# Patient Record
Sex: FEMALE | Race: WHITE | NOT HISPANIC OR LATINO | Employment: UNEMPLOYED | URBAN - METROPOLITAN AREA
[De-identification: names, ages, dates, MRNs, and addresses within clinical notes are randomized per-mention and may not be internally consistent; named-entity substitution may affect disease eponyms.]

---

## 2021-07-08 ENCOUNTER — OFFICE VISIT (OUTPATIENT)
Dept: CARDIOLOGY | Facility: CLINIC | Age: 41
End: 2021-07-08

## 2021-07-08 VITALS
HEART RATE: 104 BPM | BODY MASS INDEX: 34.15 KG/M2 | HEIGHT: 64 IN | SYSTOLIC BLOOD PRESSURE: 130 MMHG | DIASTOLIC BLOOD PRESSURE: 88 MMHG | WEIGHT: 200 LBS

## 2021-07-08 DIAGNOSIS — R00.2 PALPITATIONS: Primary | ICD-10-CM

## 2021-07-08 DIAGNOSIS — E78.5 HYPERLIPIDEMIA, UNSPECIFIED HYPERLIPIDEMIA TYPE: ICD-10-CM

## 2021-07-08 DIAGNOSIS — R42 DIZZINESS: ICD-10-CM

## 2021-07-08 DIAGNOSIS — I10 ESSENTIAL HYPERTENSION: ICD-10-CM

## 2021-07-08 PROCEDURE — 99204 OFFICE O/P NEW MOD 45 MIN: CPT | Performed by: INTERNAL MEDICINE

## 2021-07-08 RX ORDER — DOXEPIN HYDROCHLORIDE 25 MG/1
CAPSULE ORAL
COMMUNITY
Start: 2021-05-29

## 2021-07-08 RX ORDER — CETIRIZINE HYDROCHLORIDE 10 MG/1
TABLET ORAL
COMMUNITY
Start: 2021-06-10

## 2021-07-08 RX ORDER — PREGABALIN 150 MG/1
CAPSULE ORAL
COMMUNITY
Start: 2021-06-15

## 2021-07-08 RX ORDER — VENLAFAXINE HYDROCHLORIDE 150 MG/1
CAPSULE, EXTENDED RELEASE ORAL
COMMUNITY
Start: 2021-03-03

## 2021-07-08 RX ORDER — ERGOCALCIFEROL 1.25 MG/1
CAPSULE ORAL
COMMUNITY
Start: 2021-07-04

## 2021-07-08 RX ORDER — ALBUTEROL SULFATE 1.25 MG/3ML
SOLUTION RESPIRATORY (INHALATION)
COMMUNITY
Start: 2021-05-13

## 2021-07-08 RX ORDER — CYCLOBENZAPRINE HCL 10 MG
10 TABLET ORAL
COMMUNITY

## 2021-07-08 RX ORDER — HYDROCODONE BITARTRATE AND ACETAMINOPHEN 7.5; 325 MG/1; MG/1
TABLET ORAL
COMMUNITY
Start: 2021-02-19

## 2021-07-08 RX ORDER — SUMATRIPTAN 50 MG/1
TABLET, FILM COATED ORAL
COMMUNITY
Start: 2021-05-14

## 2021-07-08 RX ORDER — BISOPROLOL FUMARATE AND HYDROCHLOROTHIAZIDE 10; 6.25 MG/1; MG/1
1 TABLET ORAL DAILY
Qty: 30 TABLET | Refills: 6 | Status: SHIPPED | OUTPATIENT
Start: 2021-07-08 | End: 2022-02-28

## 2021-07-08 RX ORDER — PRAMIPEXOLE DIHYDROCHLORIDE 0.5 MG/1
TABLET ORAL
COMMUNITY
Start: 2021-07-06

## 2021-07-08 NOTE — PROGRESS NOTES
NEW PT   PALPS  REF DR HAWLEY   Subjective:        [facility]  Cardiology Consult Note    Patient Identification:  Name: Jesi Reyes  Age: 40 y.o.  Sex: female  :  1980  MRN: [id number]             CC  FAST HEART BEAT  ABNORMAL EKG  DIZZINESS, NEAR SYNCOPE  HTN  HYPERLIPIDEMIA    History of Present Illness:   40-year-old female here for the cardiac evaluation as well as establishment of the care as the patient complaining of the fast heartbeat moderate in intensity, was found to have abnormal EKG on routine evaluation    Patient also complains of significant dizziness and near syncopal episodes intermittent    Patient known to have the benign essential arterial hypertension and hyperlipidemia    Comorbid cardiac risk factors:     Past Medical History:  Past Medical History:   Diagnosis Date   • Chest pain    • Palpitation      Past Surgical History:  Past Surgical History:   Procedure Laterality Date   • HERNIA REPAIR     • HYSTERECTOMY     • TUBAL ABDOMINAL LIGATION        Allergies:  Allergies   Allergen Reactions   • Eggs Or Egg-Derived Products Nausea And Vomiting   • Emetrol Unknown - High Severity   • Ketorolac Tromethamine Hives   • Latex Hives   • Lecithin Hives   • Mometasone Furoate Other (See Comments)     Lactose intolerance   • Penicillins Swelling   • Sulfa Antibiotics Hives   • Tramadol Hives     Home Meds:  (Not in a hospital admission)    Current Meds:   [unfilled]  Social History:   Social History     Tobacco Use   • Smoking status: Current Every Day Smoker   Substance Use Topics   • Alcohol use: Never      Family History:  Family History   Problem Relation Age of Onset   • Hypertension Mother    • Hypertension Father    • Stroke Paternal Aunt    • Heart attack Paternal Uncle    • Hypertension Maternal Grandmother    • Hypertension Paternal Grandmother    • Heart disease Paternal Grandmother    • Hypertension Paternal Grandfather    • Heart disease Paternal Grandfather    •  Heart attack Paternal Grandfather         Review of Systems    Constitutional: No weakness,fatigue, fever, rigors, chills   Eyes: No vision changes, eye pain   ENT/oropharynx: No difficulty swallowing, sore throat, epistaxis, changes in hearing   Cardiovascular:  Palpitation and dizziness   Respiratory: No shortness of breath, dyspnea on exertion, cough, wheezing hemoptysis   Gastrointestinal: No abdominal pain, nausea, vomiting, diarrhea, bloody stools   Genitourinary: No hematuria, dysuria   Neurological: No headache, tremors, numbness,  one-sided weakness    Musculoskeletal: No cramps, myalgias,  joint pain, joint swelling   Integument: No rash, edema           Constitutional:  Heart Rate:  [104] 104  BP: (130)/(88) 130/88    Physical Exam   General:  Appears in no acute distress  Eyes: PERTL,  HEENT:  No JVD. Thyroid not visibly enlarged. No mucosal pallor or cyanosis  Respiratory: Respirations regular and unlabored at rest. BBS with good air entry in all fields. No crackles, rubs or wheezes auscultated  Cardiovascular: S1S2 Regular rate and rhythm. No murmur, rub or gallop auscultated. No carotid bruits. DP/PT pulses    . No pretibial pitting edema  Gastrointestinal: Abdomen soft, flat, non tender. Bowel sounds present. No hepatosplenomegaly. No ascites  Musculoskeletal: WHITE x4. No abnormal movements  Extremities: No digital clubbing or cyanosis  Skin: Color pink. Skin warm and dry to touch. No rashes  No xanthoma  Neuro: AAO x3 CN II-XII grossly intact          Procedures        Cardiographics  ECG:     Telemetry:    Echocardiogram:     Imaging  Chest X-ray:     Lab Review               @[email or web address]@              Assessment:/ Recommendations / Plan:   Patient Active Problem List   Diagnosis   • Palpitations   • Hyperlipidemia                    ICD-10-CM ICD-9-CM   1. Palpitations  R00.2 785.1   2. Hyperlipidemia, unspecified hyperlipidemia type  E78.5 272.4   3. Essential hypertension  I10 401.9   4.  Dizziness  R42 780.4     1. Palpitations  Considering the patient's symptoms as well as clinical situation and  EKG findings, along with cardiac risk factors, ischemic workup is necessary to rule out ischemic cardiomyopathy, stress induced arrhythmias, and functional capacity for diagnosis as well as prognostic consideration    - Treadmill Stress Test  - Adult Transthoracic Echo Complete W/ Cont if Necessary Per Protocol    2. Hyperlipidemia, unspecified hyperlipidemia type  Continue current treatment    3. Essential hypertension  Change in medications    4. Dizziness  Considering patient's medical condition as well as the risk factors, patient will require echocardiogram for further evaluation for the LV function, four-chamber evaluation, including the pressures, valvular function and  pericardial disease and pericardial effusion       ECHO, ETT  DC METOPROLOL  START ZIAC 10/6.25 MG PO DAILY    Pros and cons of this new medication / change medication has been explained to  the patient    Possible side effects has been explained    Associated need of the blood  Work has been explained    Need for the compliance of the medication has been explained    SEE IN 2-3 WKS      Labs/tests ordered for am      Cecelia Harrison MD  7/8/2021, 14:02 EDT      EMR Dragon/Transcription:   Dictated utilizing Dragon dictation

## 2021-07-20 PROBLEM — I10 ESSENTIAL HYPERTENSION: Status: ACTIVE | Noted: 2021-07-20

## 2021-07-20 PROBLEM — R00.2 PALPITATIONS: Status: ACTIVE | Noted: 2021-07-20

## 2021-07-20 PROBLEM — E78.5 HYPERLIPIDEMIA: Status: ACTIVE | Noted: 2021-07-20

## 2021-07-20 PROBLEM — R42 DIZZINESS: Status: ACTIVE | Noted: 2021-07-20

## 2021-08-03 ENCOUNTER — HOSPITAL ENCOUNTER (OUTPATIENT)
Dept: CARDIOLOGY | Facility: HOSPITAL | Age: 41
Discharge: HOME | End: 2021-08-03
Admitting: INTERNAL MEDICINE

## 2021-08-03 VITALS
HEIGHT: 64 IN | SYSTOLIC BLOOD PRESSURE: 124 MMHG | HEART RATE: 92 BPM | DIASTOLIC BLOOD PRESSURE: 87 MMHG | BODY MASS INDEX: 34.15 KG/M2 | WEIGHT: 200 LBS

## 2021-08-03 PROCEDURE — 93306 TTE W/DOPPLER COMPLETE: CPT | Performed by: INTERNAL MEDICINE

## 2021-08-03 PROCEDURE — 93306 TTE W/DOPPLER COMPLETE: CPT

## 2021-08-08 LAB
AORTIC DIMENSIONLESS INDEX: 0.9 (DI)
ASCENDING AORTA: 2.4 CM
BH CV ECHO MEAS - ACS: 2.2 CM
BH CV ECHO MEAS - AO ARCH DIAM (PROXIMAL TRANS.): 2.3 CM
BH CV ECHO MEAS - AO MAX PG (FULL): 1.2 MMHG
BH CV ECHO MEAS - AO MAX PG: 4.8 MMHG
BH CV ECHO MEAS - AO MEAN PG (FULL): 0.55 MMHG
BH CV ECHO MEAS - AO MEAN PG: 2.5 MMHG
BH CV ECHO MEAS - AO ROOT AREA (BSA CORRECTED): 1.6
BH CV ECHO MEAS - AO ROOT AREA: 7.8 CM^2
BH CV ECHO MEAS - AO ROOT DIAM: 3.1 CM
BH CV ECHO MEAS - AO V2 MAX: 109.9 CM/SEC
BH CV ECHO MEAS - AO V2 MEAN: 74.5 CM/SEC
BH CV ECHO MEAS - AO V2 VTI: 20.4 CM
BH CV ECHO MEAS - ASC AORTA: 2.4 CM
BH CV ECHO MEAS - AVA(I,A): 2.8 CM^2
BH CV ECHO MEAS - AVA(I,D): 2.8 CM^2
BH CV ECHO MEAS - AVA(V,A): 2.7 CM^2
BH CV ECHO MEAS - AVA(V,D): 2.7 CM^2
BH CV ECHO MEAS - BSA(HAYCOCK): 2.1 M^2
BH CV ECHO MEAS - BSA: 2 M^2
BH CV ECHO MEAS - BZI_BMI: 34.3 KILOGRAMS/M^2
BH CV ECHO MEAS - BZI_METRIC_HEIGHT: 162.6 CM
BH CV ECHO MEAS - BZI_METRIC_WEIGHT: 90.7 KG
BH CV ECHO MEAS - EDV(CUBED): 90 ML
BH CV ECHO MEAS - EDV(MOD-SP2): 52 ML
BH CV ECHO MEAS - EDV(MOD-SP4): 67 ML
BH CV ECHO MEAS - EDV(TEICH): 91.6 ML
BH CV ECHO MEAS - EF(CUBED): 83.2 %
BH CV ECHO MEAS - EF(MOD-BP): 56.6 %
BH CV ECHO MEAS - EF(MOD-SP2): 51.9 %
BH CV ECHO MEAS - EF(MOD-SP4): 61.2 %
BH CV ECHO MEAS - EF(TEICH): 76.2 %
BH CV ECHO MEAS - ESV(CUBED): 15.2 ML
BH CV ECHO MEAS - ESV(MOD-SP2): 25 ML
BH CV ECHO MEAS - ESV(MOD-SP4): 26 ML
BH CV ECHO MEAS - ESV(TEICH): 21.8 ML
BH CV ECHO MEAS - FS: 44.8 %
BH CV ECHO MEAS - IVS/LVPW: 0.87
BH CV ECHO MEAS - IVSD: 0.98 CM
BH CV ECHO MEAS - LAT PEAK E' VEL: 12.4 CM/SEC
BH CV ECHO MEAS - LV DIASTOLIC VOL/BSA (35-75): 34.3 ML/M^2
BH CV ECHO MEAS - LV MASS(C)D: 162.6 GRAMS
BH CV ECHO MEAS - LV MASS(C)DI: 83.1 GRAMS/M^2
BH CV ECHO MEAS - LV MAX PG: 3.6 MMHG
BH CV ECHO MEAS - LV MEAN PG: 2 MMHG
BH CV ECHO MEAS - LV SYSTOLIC VOL/BSA (12-30): 13.3 ML/M^2
BH CV ECHO MEAS - LV V1 MAX: 95.1 CM/SEC
BH CV ECHO MEAS - LV V1 MEAN: 65.3 CM/SEC
BH CV ECHO MEAS - LV V1 VTI: 18.4 CM
BH CV ECHO MEAS - LVIDD: 4.5 CM
BH CV ECHO MEAS - LVIDS: 2.5 CM
BH CV ECHO MEAS - LVLD AP2: 7.1 CM
BH CV ECHO MEAS - LVLD AP4: 7.7 CM
BH CV ECHO MEAS - LVLS AP2: 5.5 CM
BH CV ECHO MEAS - LVLS AP4: 6.2 CM
BH CV ECHO MEAS - LVOT AREA (M): 3.1 CM^2
BH CV ECHO MEAS - LVOT AREA: 3.1 CM^2
BH CV ECHO MEAS - LVOT DIAM: 2 CM
BH CV ECHO MEAS - LVPWD: 1.1 CM
BH CV ECHO MEAS - MED PEAK E' VEL: 11.6 CM/SEC
BH CV ECHO MEAS - MR MAX PG: 17.9 MMHG
BH CV ECHO MEAS - MR MAX VEL: 211.8 CM/SEC
BH CV ECHO MEAS - MV A DUR: 0.11 SEC
BH CV ECHO MEAS - MV A MAX VEL: 65.5 CM/SEC
BH CV ECHO MEAS - MV DEC SLOPE: 627.5 CM/SEC^2
BH CV ECHO MEAS - MV DEC TIME: 206 SEC
BH CV ECHO MEAS - MV E MAX VEL: 85.4 CM/SEC
BH CV ECHO MEAS - MV E/A: 1.3
BH CV ECHO MEAS - MV MAX PG: 4.5 MMHG
BH CV ECHO MEAS - MV MEAN PG: 1.8 MMHG
BH CV ECHO MEAS - MV P1/2T MAX VEL: 123.5 CM/SEC
BH CV ECHO MEAS - MV P1/2T: 57.7 MSEC
BH CV ECHO MEAS - MV V2 MAX: 105.9 CM/SEC
BH CV ECHO MEAS - MV V2 MEAN: 61.3 CM/SEC
BH CV ECHO MEAS - MV V2 VTI: 19.9 CM
BH CV ECHO MEAS - MVA P1/2T LCG: 1.8 CM^2
BH CV ECHO MEAS - MVA(P1/2T): 3.8 CM^2
BH CV ECHO MEAS - MVA(VTI): 2.9 CM^2
BH CV ECHO MEAS - PA ACC TIME: 0.09 SEC
BH CV ECHO MEAS - PA MAX PG (FULL): 2.7 MMHG
BH CV ECHO MEAS - PA MAX PG: 5.1 MMHG
BH CV ECHO MEAS - PA PR(ACCEL): 40.4 MMHG
BH CV ECHO MEAS - PA V2 MAX: 112.7 CM/SEC
BH CV ECHO MEAS - PULM A REVS DUR: 0.11 SEC
BH CV ECHO MEAS - PULM A REVS VEL: 50.4 CM/SEC
BH CV ECHO MEAS - PULM DIAS VEL: 43.7 CM/SEC
BH CV ECHO MEAS - PULM S/D: 1.2
BH CV ECHO MEAS - PULM SYS VEL: 51.9 CM/SEC
BH CV ECHO MEAS - RAP SYSTOLE: 3 MMHG
BH CV ECHO MEAS - RV MAX PG: 2.4 MMHG
BH CV ECHO MEAS - RV MEAN PG: 1.2 MMHG
BH CV ECHO MEAS - RV V1 MAX: 77.6 CM/SEC
BH CV ECHO MEAS - RV V1 MEAN: 51.1 CM/SEC
BH CV ECHO MEAS - RV V1 VTI: 17.1 CM
BH CV ECHO MEAS - RVSP: 23 MMHG
BH CV ECHO MEAS - SI(AO): 81.5 ML/M^2
BH CV ECHO MEAS - SI(CUBED): 38.3 ML/M^2
BH CV ECHO MEAS - SI(LVOT): 29.6 ML/M^2
BH CV ECHO MEAS - SI(MOD-SP2): 13.8 ML/M^2
BH CV ECHO MEAS - SI(MOD-SP4): 21 ML/M^2
BH CV ECHO MEAS - SI(TEICH): 35.7 ML/M^2
BH CV ECHO MEAS - SV(AO): 159.4 ML
BH CV ECHO MEAS - SV(CUBED): 74.9 ML
BH CV ECHO MEAS - SV(LVOT): 57.9 ML
BH CV ECHO MEAS - SV(MOD-SP2): 27 ML
BH CV ECHO MEAS - SV(MOD-SP4): 41 ML
BH CV ECHO MEAS - SV(TEICH): 69.8 ML
BH CV ECHO MEAS - TAPSE (>1.6): 1.9 CM
BH CV ECHO MEAS - TR MAX PG: 20 MMHG
BH CV ECHO MEAS - TR MAX VEL: 224.4 CM/SEC
BH CV ECHO MEASUREMENTS AVERAGE E/E' RATIO: 7.12
BH CV XLRA - RV BASE: 2.5 CM
BH CV XLRA - RV LENGTH: 6 CM
BH CV XLRA - RV MID: 2.3 CM
BH CV XLRA - TDI S': 14.3 CM/SEC
LEFT ATRIUM VOLUME INDEX: 16.7 ML/M2
MAXIMAL PREDICTED HEART RATE: 180 BPM
SINUS: 2.6 CM
STJ: 2.4 CM
STRESS TARGET HR: 153 BPM

## 2021-08-23 ENCOUNTER — LAB REQUISITION (OUTPATIENT)
Dept: LAB | Facility: HOSPITAL | Age: 41
End: 2021-08-23

## 2021-08-23 DIAGNOSIS — Z00.00 ENCOUNTER FOR GENERAL ADULT MEDICAL EXAMINATION WITHOUT ABNORMAL FINDINGS: ICD-10-CM

## 2021-08-23 LAB — SARS-COV-2 ORF1AB RESP QL NAA+PROBE: NOT DETECTED

## 2021-08-23 PROCEDURE — U0004 COV-19 TEST NON-CDC HGH THRU: HCPCS | Performed by: OTOLARYNGOLOGY

## 2021-09-03 ENCOUNTER — APPOINTMENT (OUTPATIENT)
Dept: CARDIOLOGY | Facility: HOSPITAL | Age: 41
End: 2021-09-03

## 2021-09-24 ENCOUNTER — APPOINTMENT (OUTPATIENT)
Dept: CARDIOLOGY | Facility: HOSPITAL | Age: 41
End: 2021-09-24

## 2021-10-05 ENCOUNTER — APPOINTMENT (OUTPATIENT)
Dept: CARDIOLOGY | Facility: HOSPITAL | Age: 41
End: 2021-10-05

## 2021-11-11 ENCOUNTER — APPOINTMENT (OUTPATIENT)
Dept: CARDIOLOGY | Facility: HOSPITAL | Age: 41
End: 2021-11-11

## 2022-02-28 RX ORDER — BISOPROLOL FUMARATE AND HYDROCHLOROTHIAZIDE 10; 6.25 MG/1; MG/1
TABLET ORAL
Qty: 30 TABLET | Refills: 3 | Status: SHIPPED | OUTPATIENT
Start: 2022-02-28 | End: 2022-07-01

## 2022-07-01 RX ORDER — BISOPROLOL FUMARATE AND HYDROCHLOROTHIAZIDE 10; 6.25 MG/1; MG/1
TABLET ORAL
Qty: 30 TABLET | Refills: 3 | Status: SHIPPED | OUTPATIENT
Start: 2022-07-01 | End: 2023-03-17

## 2022-11-30 RX ORDER — BISOPROLOL FUMARATE AND HYDROCHLOROTHIAZIDE 10; 6.25 MG/1; MG/1
TABLET ORAL
Qty: 30 TABLET | Refills: 3 | OUTPATIENT
Start: 2022-11-30

## 2022-12-21 RX ORDER — BISOPROLOL FUMARATE AND HYDROCHLOROTHIAZIDE 10; 6.25 MG/1; MG/1
TABLET ORAL
Qty: 30 TABLET | Refills: 3 | OUTPATIENT
Start: 2022-12-21

## 2022-12-29 RX ORDER — BISOPROLOL FUMARATE AND HYDROCHLOROTHIAZIDE 10; 6.25 MG/1; MG/1
TABLET ORAL
Qty: 30 TABLET | Refills: 3 | OUTPATIENT
Start: 2022-12-29

## 2023-03-17 RX ORDER — BISOPROLOL FUMARATE AND HYDROCHLOROTHIAZIDE 10; 6.25 MG/1; MG/1
TABLET ORAL
Qty: 30 TABLET | Refills: 0 | Status: SHIPPED | OUTPATIENT
Start: 2023-03-17

## 2023-04-17 RX ORDER — BISOPROLOL FUMARATE AND HYDROCHLOROTHIAZIDE 10; 6.25 MG/1; MG/1
TABLET ORAL
Qty: 30 TABLET | Refills: 3 | Status: SHIPPED | OUTPATIENT
Start: 2023-04-17

## 2023-08-24 RX ORDER — BISOPROLOL FUMARATE AND HYDROCHLOROTHIAZIDE 10; 6.25 MG/1; MG/1
TABLET ORAL
Qty: 30 TABLET | Refills: 3 | OUTPATIENT
Start: 2023-08-24

## 2023-09-05 RX ORDER — BISOPROLOL FUMARATE AND HYDROCHLOROTHIAZIDE 10; 6.25 MG/1; MG/1
TABLET ORAL
Qty: 30 TABLET | Refills: 3 | OUTPATIENT
Start: 2023-09-05

## 2023-09-25 RX ORDER — BISOPROLOL FUMARATE AND HYDROCHLOROTHIAZIDE 10; 6.25 MG/1; MG/1
TABLET ORAL
Qty: 30 TABLET | Refills: 3 | OUTPATIENT
Start: 2023-09-25

## 2023-11-10 RX ORDER — BISOPROLOL FUMARATE AND HYDROCHLOROTHIAZIDE 10; 6.25 MG/1; MG/1
TABLET ORAL
Qty: 30 TABLET | Refills: 3 | OUTPATIENT
Start: 2023-11-10

## 2023-12-13 ENCOUNTER — OFFICE VISIT (OUTPATIENT)
Dept: ORTHOPEDIC SURGERY | Facility: CLINIC | Age: 43
End: 2023-12-13
Payer: OTHER MISCELLANEOUS

## 2023-12-13 VITALS
BODY MASS INDEX: 34.33 KG/M2 | OXYGEN SATURATION: 96 % | DIASTOLIC BLOOD PRESSURE: 84 MMHG | WEIGHT: 200 LBS | HEART RATE: 100 BPM | SYSTOLIC BLOOD PRESSURE: 121 MMHG

## 2023-12-13 DIAGNOSIS — M25.562 LEFT KNEE PAIN, UNSPECIFIED CHRONICITY: Primary | ICD-10-CM

## 2023-12-13 DIAGNOSIS — S89.92XA LEFT KNEE INJURY, INITIAL ENCOUNTER: ICD-10-CM

## 2023-12-13 NOTE — PROGRESS NOTES
Chief Complaint  Initial Evaluation of the Left Knee     Subjective      Jesi Reyes presents to [facility] for initial evaluation of the left knee. She twisted her knee at work on 11/27/23.  She has had swelling, bruising and she felt a pop upon the injury.   She went to  and had X rays and placed in a brace and is here to review.  She has pain on the medial aspect of the knee and below the patella.  This is a workers compensation claim. She has been in therapy in the past and has been doing exercises at home. She cannot take NSAIDS due to decrease kidney function.     Allergies   Allergen Reactions    Eggs Or Egg-Derived Products Nausea And Vomiting    Emetrol Unknown - High Severity    Ketorolac Tromethamine Hives    Latex Hives    Lecithin Hives    Mometasone Furoate Other (See Comments)     Lactose intolerance    Penicillins Swelling    Sulfa Antibiotics Hives    Tramadol Hives        Social History     Socioeconomic History    Marital status:    Tobacco Use    Smoking status: Every Day    Smokeless tobacco: Never   Substance and Sexual Activity    Alcohol use: Never    Drug use: Never        I reviewed the patient's chief complaint, history of present illness, review of systems, past medical history, surgical history, family history, social history, medications, and allergy list.     Review of Systems     Constitutional: Denies fevers, chills, weight loss  Cardiovascular: Denies chest pain, shortness of breath  Skin: Denies rashes, acute skin changes  Neurologic: Denies headache, loss of consciousness        Vital Signs:   /84   Pulse 100   Wt 90.7 kg (200 lb)   SpO2 96%   BMI 34.33 kg/m²          Physical Exam  General: Alert. No acute distress    Ortho Exam        LEFT KNEE Flexion 115-. Extension -3. Stable to varus/valgus stress. Stable to anterior/posterior drawer. Neurovascularly intact. Positive Sekou. Negative Lachman. Positive EHL, FHL, HS and  TA. Sensation intact to light touch all 5 nerves of the foot. Ambulates with Antalgic gait. Patella is well tracking. Calf supple, non-tender. Positive tenderness to the medial joint line. Negative tenderness to the lateral joint line. Negative Crepitus. Good strength to hamstrings, quadriceps, dorsiflexors, and plantar flexors.  Knee Extensor Mechanism intact. Mildly positive Apley's  Moderate swelling.       Procedures        Imaging Results (Most Recent)       None             Result Review :             Assessment and Plan     Diagnoses and all orders for this visit:    1. Left knee pain, unspecified chronicity (Primary)    2. Left knee injury, initial encounter        Discussed the treatment plan with the patient.     MRI of the left knee to assess the structure.     Educated on risk of smoking/nicotine. Discussed options for smoking cessation. and Call or return if worsening symptoms.    Follow Up     After MRI of the left knee.       Patient was given instructions and counseling regarding her condition or for health maintenance advice. Please see specific information pulled into the AVS if appropriate.     Scribed for Reid Rodgers MD by Kacey Mazariegos MA.  12/13/23   13:22 EST      I have personally performed the services described in this document as scribed by the above individual and it is both accurate and complete. Reid Rodgers MD 12/13/23

## 2023-12-26 ENCOUNTER — TELEPHONE (OUTPATIENT)
Dept: ORTHOPEDIC SURGERY | Facility: CLINIC | Age: 43
End: 2023-12-26
Payer: MEDICAID

## 2023-12-26 NOTE — TELEPHONE ENCOUNTER
Caller: NABEEL -  WITH [employer]    Best call back number: [phone]    What form or medical record are you requesting: RECORDS FOR THE LEFT KNEE STARTING 12/13/2023    Who is requesting this form or medical record from you: W/C     How would you like to receive the form or medical records (pick-up, mail, fax): FAX   If fax, what is the fax number: [phone]

## 2024-02-15 ENCOUNTER — TELEPHONE (OUTPATIENT)
Dept: GASTROENTEROLOGY | Facility: CLINIC | Age: 44
End: 2024-02-15
Payer: MEDICAID

## 2024-02-16 ENCOUNTER — TELEPHONE (OUTPATIENT)
Dept: GASTROENTEROLOGY | Facility: CLINIC | Age: 44
End: 2024-02-16
Payer: MEDICAID

## 2024-12-23 ENCOUNTER — APPOINTMENT (OUTPATIENT)
Dept: GENERAL RADIOLOGY | Facility: HOSPITAL | Age: 44
End: 2024-12-23
Payer: MEDICAID

## 2024-12-23 ENCOUNTER — HOSPITAL ENCOUNTER (EMERGENCY)
Facility: HOSPITAL | Age: 44
Discharge: HOME | End: 2024-12-23
Attending: EMERGENCY MEDICINE | Admitting: EMERGENCY MEDICINE
Payer: MEDICAID

## 2024-12-23 VITALS
WEIGHT: 178.13 LBS | RESPIRATION RATE: 18 BRPM | DIASTOLIC BLOOD PRESSURE: 85 MMHG | HEIGHT: 64 IN | SYSTOLIC BLOOD PRESSURE: 118 MMHG | TEMPERATURE: 98.9 F | HEART RATE: 102 BPM | OXYGEN SATURATION: 95 % | BODY MASS INDEX: 30.41 KG/M2

## 2024-12-23 DIAGNOSIS — J21.0 RSV (ACUTE BRONCHIOLITIS DUE TO RESPIRATORY SYNCYTIAL VIRUS): Primary | ICD-10-CM

## 2024-12-23 LAB
ALBUMIN SERPL-MCNC: 4.2 G/DL (ref 3.5–5.2)
ALBUMIN/GLOB SERPL: 1.4 G/DL
ALP SERPL-CCNC: 49 U/L (ref 39–117)
ALT SERPL W P-5'-P-CCNC: 13 U/L (ref 1–33)
ANION GAP SERPL CALCULATED.3IONS-SCNC: 9.8 MMOL/L (ref 5–15)
AST SERPL-CCNC: 20 U/L (ref 1–32)
BASOPHILS # BLD AUTO: 0.03 10*3/MM3 (ref 0–0.2)
BASOPHILS NFR BLD AUTO: 0.4 % (ref 0–1.5)
BILIRUB SERPL-MCNC: 0.3 MG/DL (ref 0–1.2)
BUN SERPL-MCNC: 4 MG/DL (ref 6–20)
BUN/CREAT SERPL: 4.9 (ref 7–25)
CALCIUM SPEC-SCNC: 9.1 MG/DL (ref 8.6–10.5)
CHLORIDE SERPL-SCNC: 100 MMOL/L (ref 98–107)
CO2 SERPL-SCNC: 25.2 MMOL/L (ref 22–29)
CREAT SERPL-MCNC: 0.81 MG/DL (ref 0.57–1)
DEPRECATED RDW RBC AUTO: 44 FL (ref 37–54)
EGFRCR SERPLBLD CKD-EPI 2021: 92.5 ML/MIN/1.73
EOSINOPHIL # BLD AUTO: 0.15 10*3/MM3 (ref 0–0.4)
EOSINOPHIL NFR BLD AUTO: 2.1 % (ref 0.3–6.2)
ERYTHROCYTE [DISTWIDTH] IN BLOOD BY AUTOMATED COUNT: 12.8 % (ref 12.3–15.4)
FLUAV SUBTYP SPEC NAA+PROBE: NOT DETECTED
FLUBV RNA ISLT QL NAA+PROBE: NOT DETECTED
GLOBULIN UR ELPH-MCNC: 3 GM/DL
GLUCOSE SERPL-MCNC: 78 MG/DL (ref 65–99)
HCT VFR BLD AUTO: 43.1 % (ref 34–46.6)
HGB BLD-MCNC: 14.4 G/DL (ref 12–15.9)
HOLD SPECIMEN: NORMAL
HOLD SPECIMEN: NORMAL
IMM GRANULOCYTES # BLD AUTO: 0.02 10*3/MM3 (ref 0–0.05)
IMM GRANULOCYTES NFR BLD AUTO: 0.3 % (ref 0–0.5)
LYMPHOCYTES # BLD AUTO: 1.34 10*3/MM3 (ref 0.7–3.1)
LYMPHOCYTES NFR BLD AUTO: 19.1 % (ref 19.6–45.3)
MCH RBC QN AUTO: 31.6 PG (ref 26.6–33)
MCHC RBC AUTO-ENTMCNC: 33.4 G/DL (ref 31.5–35.7)
MCV RBC AUTO: 94.5 FL (ref 79–97)
MONOCYTES # BLD AUTO: 0.41 10*3/MM3 (ref 0.1–0.9)
MONOCYTES NFR BLD AUTO: 5.8 % (ref 5–12)
NEUTROPHILS NFR BLD AUTO: 5.06 10*3/MM3 (ref 1.7–7)
NEUTROPHILS NFR BLD AUTO: 72.3 % (ref 42.7–76)
NRBC BLD AUTO-RTO: 0 /100 WBC (ref 0–0.2)
NT-PROBNP SERPL-MCNC: <36 PG/ML (ref 0–450)
PLATELET # BLD AUTO: 176 10*3/MM3 (ref 140–450)
PMV BLD AUTO: 11.2 FL (ref 6–12)
POTASSIUM SERPL-SCNC: 3.9 MMOL/L (ref 3.5–5.2)
PROT SERPL-MCNC: 7.2 G/DL (ref 6–8.5)
QT INTERVAL: 340 MS
QTC INTERVAL: 424 MS
RBC # BLD AUTO: 4.56 10*6/MM3 (ref 3.77–5.28)
RSV RNA NPH QL NAA+NON-PROBE: DETECTED
SARS-COV-2 RNA RESP QL NAA+PROBE: NOT DETECTED
SODIUM SERPL-SCNC: 135 MMOL/L (ref 136–145)
TROPONIN T SERPL HS-MCNC: <6 NG/L
WBC NRBC COR # BLD AUTO: 7.01 10*3/MM3 (ref 3.4–10.8)
WHOLE BLOOD HOLD COAG: NORMAL
WHOLE BLOOD HOLD SPECIMEN: NORMAL

## 2024-12-23 PROCEDURE — 96374 THER/PROPH/DIAG INJ IV PUSH: CPT

## 2024-12-23 PROCEDURE — 83880 ASSAY OF NATRIURETIC PEPTIDE: CPT | Performed by: EMERGENCY MEDICINE

## 2024-12-23 PROCEDURE — 84484 ASSAY OF TROPONIN QUANT: CPT | Performed by: EMERGENCY MEDICINE

## 2024-12-23 PROCEDURE — 94640 AIRWAY INHALATION TREATMENT: CPT

## 2024-12-23 PROCEDURE — 99284 EMERGENCY DEPT VISIT MOD MDM: CPT

## 2024-12-23 PROCEDURE — 80053 COMPREHEN METABOLIC PANEL: CPT | Performed by: EMERGENCY MEDICINE

## 2024-12-23 PROCEDURE — 94761 N-INVAS EAR/PLS OXIMETRY MLT: CPT

## 2024-12-23 PROCEDURE — 93005 ELECTROCARDIOGRAM TRACING: CPT | Performed by: EMERGENCY MEDICINE

## 2024-12-23 PROCEDURE — 71045 X-RAY EXAM CHEST 1 VIEW: CPT

## 2024-12-23 PROCEDURE — 94664 DEMO&/EVAL PT USE INHALER: CPT

## 2024-12-23 PROCEDURE — 25010000002 METHYLPREDNISOLONE PER 125 MG: Performed by: EMERGENCY MEDICINE

## 2024-12-23 PROCEDURE — 87637 SARSCOV2&INF A&B&RSV AMP PRB: CPT | Performed by: EMERGENCY MEDICINE

## 2024-12-23 PROCEDURE — 85025 COMPLETE CBC W/AUTO DIFF WBC: CPT | Performed by: EMERGENCY MEDICINE

## 2024-12-23 PROCEDURE — 94799 UNLISTED PULMONARY SVC/PX: CPT

## 2024-12-23 RX ORDER — METHYLPREDNISOLONE SODIUM SUCCINATE 125 MG/2ML
125 INJECTION, POWDER, LYOPHILIZED, FOR SOLUTION INTRAMUSCULAR; INTRAVENOUS ONCE
Status: COMPLETED | OUTPATIENT
Start: 2024-12-23 | End: 2024-12-23

## 2024-12-23 RX ORDER — PREDNISONE 50 MG/1
50 TABLET ORAL DAILY
Qty: 5 TABLET | Refills: 0 | Status: SHIPPED | OUTPATIENT
Start: 2024-12-23 | End: 2024-12-28

## 2024-12-23 RX ORDER — IPRATROPIUM BROMIDE AND ALBUTEROL SULFATE 2.5; .5 MG/3ML; MG/3ML
3 SOLUTION RESPIRATORY (INHALATION) ONCE
Status: COMPLETED | OUTPATIENT
Start: 2024-12-23 | End: 2024-12-23

## 2024-12-23 RX ORDER — SODIUM CHLORIDE 0.9 % (FLUSH) 0.9 %
10 SYRINGE (ML) INJECTION AS NEEDED
Status: DISCONTINUED | OUTPATIENT
Start: 2024-12-23 | End: 2024-12-23 | Stop reason: HOSPADM

## 2024-12-23 RX ADMIN — METHYLPREDNISOLONE SODIUM SUCCINATE 125 MG: 125 INJECTION, POWDER, FOR SOLUTION INTRAMUSCULAR; INTRAVENOUS at 10:15

## 2024-12-23 RX ADMIN — IPRATROPIUM BROMIDE AND ALBUTEROL SULFATE 3 ML: .5; 3 SOLUTION RESPIRATORY (INHALATION) at 10:34

## 2024-12-23 NOTE — DISCHARGE INSTRUCTIONS
Return to the emergency department immediately for persistent chest pain or difficulty breathing.  Stay well-hydrated.

## 2024-12-23 NOTE — ED PROVIDER NOTES
Time: 8:45 AM EST  Date of encounter:  12/23/2024  Independent Historian/Clinical History and Information was obtained by:   Patient    History is limited by: N/A    Chief Complaint: Cough/congestion, chest pain and difficulty breathing      History of Present Illness:  Patient is a 44 y.o. year old female who presents to the emergency department for evaluation of cough, congestion for the past 1 week.  Patient complains of sinus drainage and left greater than right ear pain.  Has been exposed to flu and COVID recently.      Patient Care Team  Primary Care Provider: Moustapha Zhang MD    Past Medical History:     Allergies   Allergen Reactions    Egg-Derived Products Nausea And Vomiting    Emetrol Unknown - High Severity    Ketorolac Tromethamine Hives    Latex Hives    Lecithin Hives    Mometasone Furoate Other (See Comments)     Lactose intolerance    Penicillins Swelling    Sulfa Antibiotics Hives    Tramadol Hives     Past Medical History:   Diagnosis Date    Asthma     Chest pain     COPD (chronic obstructive pulmonary disease)     Depression     Diabetes mellitus     GERD (gastroesophageal reflux disease)     Hyperlipidemia     Hypertension     Kidney stone     Palpitation     Stroke      Past Surgical History:   Procedure Laterality Date    HERNIA REPAIR      HYSTERECTOMY      TUBAL ABDOMINAL LIGATION       Family History   Problem Relation Age of Onset    Hypertension Mother     Hypertension Father     Stroke Paternal Aunt     Heart attack Paternal Uncle     Hypertension Maternal Grandmother     Hypertension Paternal Grandmother     Heart disease Paternal Grandmother     Hypertension Paternal Grandfather     Heart disease Paternal Grandfather     Heart attack Paternal Grandfather        Home Medications:  Prior to Admission medications    Medication Sig Start Date End Date Taking? Authorizing Provider   albuterol (ACCUNEB) 1.25 MG/3ML nebulizer solution  5/13/21   Provider, MD Margarita   Atorvastatin  Calcium (LIPITOR PO) Take  by mouth.    Margarita Brown MD   bisoprolol-hydrochlorothiazide (ZIAC) 10-6.25 MG per tablet TAKE ONE TABLET BY MOUTH DAILY 4/17/23   Cecelia Harrison MD   cetirizine (zyrTEC) 10 MG tablet  6/10/21   Margarita Brown MD   cyclobenzaprine (FLEXERIL) 10 MG tablet Take 1 tablet by mouth.    Margarita Brown MD   doxepin (SINEquan) 25 MG capsule  5/29/21   Margarita Brown MD   HYDROcodone-acetaminophen (NORCO) 7.5-325 MG per tablet  2/19/21   Margarita Brown MD   mupirocin (BACTROBAN) 2 % ointment  6/19/21   Margarita Brown MD   pramipexole (MIRAPEX) 0.5 MG tablet  7/6/21   Margarita Brown MD   pregabalin (LYRICA) 150 MG capsule  6/15/21   Margarita Brown MD   SUMAtriptan (IMITREX) 50 MG tablet  5/14/21   Margarita Brown MD   venlafaxine XR (EFFEXOR-XR) 150 MG 24 hr capsule  3/3/21   Margarita Brown MD   vitamin D (ERGOCALCIFEROL) 1.25 MG (51354 UT) capsule capsule  7/4/21   Margarita Brown MD        Social History:   Social History     Tobacco Use    Smoking status: Every Day     Types: Cigarettes    Smokeless tobacco: Never   Vaping Use    Vaping status: Never Used   Substance Use Topics    Alcohol use: Never    Drug use: Never         Review of Systems:  Review of Systems   Constitutional:  Negative for chills and fever.   HENT:  Positive for congestion and ear pain. Negative for rhinorrhea and sore throat.    Eyes:  Negative for photophobia.   Respiratory:  Positive for cough and wheezing. Negative for apnea, chest tightness and shortness of breath.    Cardiovascular:  Negative for chest pain and palpitations.   Gastrointestinal:  Negative for abdominal pain, diarrhea, nausea and vomiting.   Endocrine: Negative.    Genitourinary:  Negative for difficulty urinating and dysuria.   Musculoskeletal:  Negative for back pain, joint swelling and myalgias.   Skin:  Negative for color change and wound.   Allergic/Immunologic:  "Negative.    Neurological:  Negative for seizures and headaches.   Psychiatric/Behavioral: Negative.     All other systems reviewed and are negative.       Physical Exam:  /85 (BP Location: Left arm, Patient Position: Lying)   Pulse 102   Temp 98.9 °F (37.2 °C) (Oral)   Resp 18   Ht 162.6 cm (64\")   Wt 80.8 kg (178 lb 2.1 oz)   SpO2 95%   BMI 30.58 kg/m²     Physical Exam  Vitals and nursing note reviewed.   Constitutional:       General: She is awake.      Appearance: Normal appearance. She is well-developed.   HENT:      Head: Normocephalic and atraumatic.      Nose: Nose normal.      Mouth/Throat:      Mouth: Mucous membranes are moist.   Eyes:      Extraocular Movements: Extraocular movements intact.      Pupils: Pupils are equal, round, and reactive to light.   Cardiovascular:      Rate and Rhythm: Normal rate and regular rhythm.      Heart sounds: Normal heart sounds.   Pulmonary:      Effort: Pulmonary effort is normal. No tachypnea, accessory muscle usage or respiratory distress.      Breath sounds: Examination of the right-upper field reveals wheezing. Examination of the left-upper field reveals wheezing. Wheezing present. No rhonchi or rales.   Abdominal:      General: Bowel sounds are normal.      Palpations: Abdomen is soft.      Tenderness: There is no abdominal tenderness. There is no guarding or rebound.      Comments: No rigidity   Musculoskeletal:         General: No tenderness. Normal range of motion.      Cervical back: Normal range of motion and neck supple.   Skin:     General: Skin is warm and dry.      Coloration: Skin is not jaundiced.   Neurological:      General: No focal deficit present.      Mental Status: She is alert. Mental status is at baseline.   Psychiatric:         Mood and Affect: Mood normal.                    Medical Decision Making:      Comorbidities that affect care:    Diabetes, hypertension, hyperlipidemia, COPD/asthma    External Notes " reviewed:    None      The following orders were placed and all results were independently analyzed by me:  Orders Placed This Encounter   Procedures    COVID-19, FLU A/B, RSV PCR 1 HR TAT - Swab, Nasopharynx    XR Chest 1 View    Jones Draw    Comprehensive Metabolic Panel    BNP    High Sensitivity Troponin T    CBC Auto Differential    NPO Diet NPO Type: Strict NPO    Undress & Gown    Continuous Pulse Oximetry    Vital Signs    Oxygen Therapy- Nasal Cannula; Titrate 1-6 LPM Per SpO2; 90 - 95%    ECG 12 Lead ED Triage Standing Order; SOA    Insert Peripheral IV    CBC & Differential    Green Top (Gel)    Lavender Top    Gold Top - SST    Light Blue Top       Medications Given in the Emergency Department:  Medications   sodium chloride 0.9 % flush 10 mL (has no administration in time range)   methylPREDNISolone sodium succinate (SOLU-Medrol) injection 125 mg (125 mg Intravenous Given 12/23/24 1015)   ipratropium-albuterol (DUO-NEB) nebulizer solution 3 mL (3 mL Nebulization Given 12/23/24 1034)        ED Course:    ED Course as of 12/23/24 1128   Mon Dec 23, 2024   0848 I have personally interpreted the EKG today and it shows no evidence of any acute ischemia or heart arrhythmia. [RP]      ED Course User Index  [RP] Brenden Dowell MD       Labs:    Lab Results (last 24 hours)       Procedure Component Value Units Date/Time    CBC & Differential [[id number]]  (Abnormal) Collected: 12/23/24 0854    Specimen: Blood Updated: 12/23/24 0907    Narrative:      The following orders were created for panel order CBC & Differential.  Procedure                               Abnormality         Status                     ---------                               -----------         ------                     CBC Auto Differential[[id number]]        Abnormal            Final result                 Please view results for these tests on the individual orders.    Comprehensive Metabolic Panel [[id number]]  (Abnormal) Collected:  12/23/24 0854    Specimen: Blood Updated: 12/23/24 0928     Glucose 78 mg/dL      BUN 4 mg/dL      Creatinine 0.81 mg/dL      Sodium 135 mmol/L      Potassium 3.9 mmol/L      Comment: Slight hemolysis detected by analyzer. Result may be falsely elevated.        Chloride 100 mmol/L      CO2 25.2 mmol/L      Calcium 9.1 mg/dL      Total Protein 7.2 g/dL      Albumin 4.2 g/dL      ALT (SGPT) 13 U/L      AST (SGOT) 20 U/L      Comment: Slight hemolysis detected by analyzer. Result may be falsely elevated.        Alkaline Phosphatase 49 U/L      Total Bilirubin 0.3 mg/dL      Globulin 3.0 gm/dL      A/G Ratio 1.4 g/dL      BUN/Creatinine Ratio 4.9     Anion Gap 9.8 mmol/L      eGFR 92.5 mL/min/1.73     Narrative:      GFR Categories in Chronic Kidney Disease (CKD)      GFR Category          GFR (mL/min/1.73)    Interpretation  G1                     90 or greater         Normal or high (1)  G2                      60-89                Mild decrease (1)  G3a                   45-59                Mild to moderate decrease  G3b                   30-44                Moderate to severe decrease  G4                    15-29                Severe decrease  G5                    14 or less           Kidney failure          (1)In the absence of evidence of kidney disease, neither GFR category G1 or G2 fulfill the criteria for CKD.    eGFR calculation 2021 CKD-EPI creatinine equation, which does not include race as a factor    BNP [[id number]]  (Normal) Collected: 12/23/24 0854    Specimen: Blood Updated: 12/23/24 0924     proBNP <36.0 pg/mL     Narrative:      This assay is used as an aid in the diagnosis of individuals suspected of having heart failure. It can be used as an aid in the diagnosis of acute decompensated heart failure (ADHF) in patients presenting with signs and symptoms of ADHF to the emergency department (ED). In addition, NT-proBNP of <300 pg/mL indicates ADHF is not likely.    Age Range Result Interpretation   NT-proBNP Concentration (pg/mL:      <50             Positive            >450                   Gray                 300-450                    Negative             <300    50-75           Positive            >900                  Gray                300-900                  Negative            <300      >75             Positive            >1800                  Gray                300-1800                  Negative            <300    High Sensitivity Troponin T [[id number]]  (Normal) Collected: 12/23/24 0854    Specimen: Blood Updated: 12/23/24 0927     HS Troponin T <6 ng/L     Narrative:      High Sensitive Troponin T Reference Range:  <14.0 ng/L- Negative Female for AMI  <22.0 ng/L- Negative Male for AMI  >=14 - Abnormal Female indicating possible myocardial injury.  >=22 - Abnormal Male indicating possible myocardial injury.   Clinicians would have to utilize clinical acumen, EKG, Troponin, and serial changes to determine if it is an Acute Myocardial Infarction or myocardial injury due to an underlying chronic condition.         CBC Auto Differential [[id number]]  (Abnormal) Collected: 12/23/24 0854    Specimen: Blood Updated: 12/23/24 0907     WBC 7.01 10*3/mm3      RBC 4.56 10*6/mm3      Hemoglobin 14.4 g/dL      Hematocrit 43.1 %      MCV 94.5 fL      MCH 31.6 pg      MCHC 33.4 g/dL      RDW 12.8 %      RDW-SD 44.0 fl      MPV 11.2 fL      Platelets 176 10*3/mm3      Neutrophil % 72.3 %      Lymphocyte % 19.1 %      Monocyte % 5.8 %      Eosinophil % 2.1 %      Basophil % 0.4 %      Immature Grans % 0.3 %      Neutrophils, Absolute 5.06 10*3/mm3      Lymphocytes, Absolute 1.34 10*3/mm3      Monocytes, Absolute 0.41 10*3/mm3      Eosinophils, Absolute 0.15 10*3/mm3      Basophils, Absolute 0.03 10*3/mm3      Immature Grans, Absolute 0.02 10*3/mm3      nRBC 0.0 /100 WBC     COVID-19, FLU A/B, RSV PCR 1 HR TAT - Swab, Nasopharynx [[id number]]  (Abnormal) Collected: 12/23/24 0855    Specimen: Swab from Nasopharynx  Updated: 12/23/24 0945     COVID19 Not Detected     Influenza A PCR Not Detected     Influenza B PCR Not Detected     RSV, PCR Detected    Narrative:      Fact sheet for providers: https://www.fda.gov/media/350184/download    Fact sheet for patients: https://www.fda.gov/media/982202/download    Test performed by PCR.             Imaging:    XR Chest 1 View    Result Date: 12/23/2024  XR CHEST 1 VW Date of Exam: 12/23/2024 8:59 AM EST Indication: SOA Triage Protocol Comparison: None available. Findings: Heart and pulmonary vessels and mediastinal contours appear within normal limits. Lung fields are clear of acute infiltrates or effusions. There is no pneumothorax.     Impression: Negative. Electronically Signed: Karlie Casillas MD  12/23/2024 9:06 AM EST  Workstation ID: [id number]       Differential Diagnosis and Discussion:    Cough: Differential diagnosis includes but is not limited to pneumonia, acute bronchitis, upper respiratory infection, ACE inhibitor use, allergic reaction, epiglottitis, seasonal allergies, chemical irritants, exercise-induced asthma, viral syndrome.  Dyspnea: Differential diagnosis includes but is not limited to metabolic acidosis, neurological disorders, psychogenic, asthma, pneumothorax, upper airway obstruction, COPD, pneumonia, noncardiogenic pulmonary edema, interstitial lung disease, anemia, congestive heart failure, and pulmonary embolism    PROCEDURES:    Labs were collected in the emergency department and all labs were reviewed and interpreted by me.  X-ray were performed in the emergency department and all X-ray impressions were independently interpreted by me.  An EKG was performed and the EKG was interpreted by me.    ECG 12 Lead ED Triage Standing Order; SOA   Preliminary Result   HEART RATE=93  bpm   RR Wtrzpogd=706  ms   LA Odygurrf=304  ms   P Horizontal Axis=2  deg   P Front Axis=42  deg   QRSD Interval=71  ms   QT Ulufhkqo=986  ms   GQaD=444  ms   QRS Axis=20  deg   T Wave  Axis=44  deg   - NORMAL ECG -   Sinus rhythm   Date and Time of Study:2024-12-23 08:33:58          Procedures    MDM                     Patient Care Considerations:    SEPSIS was considered but is NOT present in the emergency department as SIRS criteria is not present. ANTIBIOTICS: I considered prescribing antibiotics as an outpatient however no bacterial focus of infection was found.      Consultants/Shared Management Plan:    None    Social Determinants of Health:    Patient is independent, reliable, and has access to care.       Disposition and Care Coordination:    Discharged: The patient is suitable and stable for discharge with no need for consideration of admission.    I have explained the patient´s condition, diagnoses and treatment plan based on the information available to me at this time. I have answered questions and addressed any concerns. The patient has a good  understanding of the patient´s diagnosis, condition, and treatment plan as can be expected at this point. The vital signs have been stable. The patient´s condition is stable and appropriate for discharge from the emergency department.      The patient will pursue further outpatient evaluation with the primary care physician or other designated or consulting physician as outlined in the discharge instructions. They are agreeable to this plan of care and follow-up instructions have been explained in detail. The patient has received these instructions in written format and has expressed an understanding of the discharge instructions. The patient is aware that any significant change in condition or worsening of symptoms should prompt an immediate return to this or the closest emergency department or call to 911.    Final diagnoses:   RSV (acute bronchiolitis due to respiratory syncytial virus)        ED Disposition       ED Disposition   Discharge    Condition   Stable    Comment   --               This medical record created using voice  recognition software.             Brenden Dowell MD  12/23/24 4688

## 2024-12-23 NOTE — Clinical Note
[facility] EMERGENCY ROOM  [address] [place] [address]  Phone: [phone]  Fax: [phone]    Jesi Reyes was seen and treated in our emergency department on 12/23/2024.  She may return to work on 12/26/2024.         Thank you for choosing [facility].    Brenden Dowell MD

## 2025-01-15 ENCOUNTER — HOSPITAL ENCOUNTER (EMERGENCY)
Facility: HOSPITAL | Age: 45
Discharge: HOME | End: 2025-01-15
Attending: EMERGENCY MEDICINE
Payer: COMMERCIAL

## 2025-01-15 ENCOUNTER — APPOINTMENT (OUTPATIENT)
Dept: GENERAL RADIOLOGY | Facility: HOSPITAL | Age: 45
End: 2025-01-15
Payer: COMMERCIAL

## 2025-01-15 ENCOUNTER — APPOINTMENT (OUTPATIENT)
Dept: CT IMAGING | Facility: HOSPITAL | Age: 45
End: 2025-01-15
Payer: COMMERCIAL

## 2025-01-15 VITALS
WEIGHT: 176.37 LBS | HEIGHT: 64 IN | OXYGEN SATURATION: 98 % | TEMPERATURE: 98.5 F | DIASTOLIC BLOOD PRESSURE: 74 MMHG | SYSTOLIC BLOOD PRESSURE: 121 MMHG | BODY MASS INDEX: 30.11 KG/M2 | RESPIRATION RATE: 16 BRPM | HEART RATE: 71 BPM

## 2025-01-15 DIAGNOSIS — S30.1XXA ABDOMINAL WALL HEMATOMA, INITIAL ENCOUNTER: ICD-10-CM
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
## 2025-01-15 DIAGNOSIS — N39.0 ACUTE LOWER UTI (URINARY TRACT INFECTION): ICD-10-CM

## 2025-01-15 DIAGNOSIS — S70.01XA CONTUSION OF RIGHT HIP, INITIAL ENCOUNTER: ICD-10-CM

## 2025-01-15 LAB
ALBUMIN SERPL-MCNC: 4.4 G/DL (ref 3.5–5.2)
ALBUMIN/GLOB SERPL: 1.6 G/DL
ALP SERPL-CCNC: 49 U/L (ref 39–117)
ALT SERPL W P-5'-P-CCNC: 16 U/L (ref 1–33)
ANION GAP SERPL CALCULATED.3IONS-SCNC: 9.8 MMOL/L (ref 5–15)
AST SERPL-CCNC: 19 U/L (ref 1–32)
BACTERIA UR QL AUTO: ABNORMAL /HPF
BASOPHILS # BLD AUTO: 0.05 10*3/MM3 (ref 0–0.2)
BASOPHILS NFR BLD AUTO: 0.5 % (ref 0–1.5)
BILIRUB SERPL-MCNC: 0.3 MG/DL (ref 0–1.2)
BILIRUB UR QL STRIP: NEGATIVE
BUN SERPL-MCNC: 6 MG/DL (ref 6–20)
BUN/CREAT SERPL: 10 (ref 7–25)
CALCIUM SPEC-SCNC: 9.2 MG/DL (ref 8.6–10.5)
CHLORIDE SERPL-SCNC: 101 MMOL/L (ref 98–107)
CLARITY UR: ABNORMAL
CO2 SERPL-SCNC: 26.2 MMOL/L (ref 22–29)
COLOR UR: YELLOW
CREAT SERPL-MCNC: 0.6 MG/DL (ref 0.57–1)
DEPRECATED RDW RBC AUTO: 45.6 FL (ref 37–54)
EGFRCR SERPLBLD CKD-EPI 2021: 114.4 ML/MIN/1.73
EOSINOPHIL # BLD AUTO: 0.19 10*3/MM3 (ref 0–0.4)
EOSINOPHIL NFR BLD AUTO: 2 % (ref 0.3–6.2)
ERYTHROCYTE [DISTWIDTH] IN BLOOD BY AUTOMATED COUNT: 13.2 % (ref 12.3–15.4)
GLOBULIN UR ELPH-MCNC: 2.8 GM/DL
GLUCOSE SERPL-MCNC: 80 MG/DL (ref 65–99)
GLUCOSE UR STRIP-MCNC: NEGATIVE MG/DL
HCT VFR BLD AUTO: 41.9 % (ref 34–46.6)
HGB BLD-MCNC: 13.8 G/DL (ref 12–15.9)
HGB UR QL STRIP.AUTO: NEGATIVE
HOLD SPECIMEN: NORMAL
HOLD SPECIMEN: NORMAL
HYALINE CASTS UR QL AUTO: ABNORMAL /LPF
IMM GRANULOCYTES # BLD AUTO: 0.02 10*3/MM3 (ref 0–0.05)
IMM GRANULOCYTES NFR BLD AUTO: 0.2 % (ref 0–0.5)
KETONES UR QL STRIP: NEGATIVE
LEUKOCYTE ESTERASE UR QL STRIP.AUTO: ABNORMAL
LIPASE SERPL-CCNC: 23 U/L (ref 13–60)
LYMPHOCYTES # BLD AUTO: 3.08 10*3/MM3 (ref 0.7–3.1)
LYMPHOCYTES NFR BLD AUTO: 32.6 % (ref 19.6–45.3)
MCH RBC QN AUTO: 31.2 PG (ref 26.6–33)
MCHC RBC AUTO-ENTMCNC: 32.9 G/DL (ref 31.5–35.7)
MCV RBC AUTO: 94.6 FL (ref 79–97)
MONOCYTES # BLD AUTO: 0.39 10*3/MM3 (ref 0.1–0.9)
MONOCYTES NFR BLD AUTO: 4.1 % (ref 5–12)
NEUTROPHILS NFR BLD AUTO: 5.72 10*3/MM3 (ref 1.7–7)
NEUTROPHILS NFR BLD AUTO: 60.6 % (ref 42.7–76)
NITRITE UR QL STRIP: NEGATIVE
NRBC BLD AUTO-RTO: 0 /100 WBC (ref 0–0.2)
PH UR STRIP.AUTO: 7 [PH] (ref 5–8)
PLATELET # BLD AUTO: 185 10*3/MM3 (ref 140–450)
PMV BLD AUTO: 11.1 FL (ref 6–12)
POTASSIUM SERPL-SCNC: 4.7 MMOL/L (ref 3.5–5.2)
PROT SERPL-MCNC: 7.2 G/DL (ref 6–8.5)
PROT UR QL STRIP: NEGATIVE
RBC # BLD AUTO: 4.43 10*6/MM3 (ref 3.77–5.28)
RBC # UR STRIP: ABNORMAL /HPF
REF LAB TEST METHOD: ABNORMAL
SODIUM SERPL-SCNC: 137 MMOL/L (ref 136–145)
SP GR UR STRIP: 1.01 (ref 1–1.03)
SQUAMOUS #/AREA URNS HPF: ABNORMAL /HPF
UROBILINOGEN UR QL STRIP: ABNORMAL
WBC # UR STRIP: ABNORMAL /HPF
WBC NRBC COR # BLD AUTO: 9.45 10*3/MM3 (ref 3.4–10.8)
WHOLE BLOOD HOLD COAG: NORMAL
WHOLE BLOOD HOLD SPECIMEN: NORMAL

## 2025-01-15 PROCEDURE — 70450 CT HEAD/BRAIN W/O DYE: CPT

## 2025-01-15 PROCEDURE — 99285 EMERGENCY DEPT VISIT HI MDM: CPT

## 2025-01-15 PROCEDURE — 81001 URINALYSIS AUTO W/SCOPE: CPT | Performed by: EMERGENCY MEDICINE

## 2025-01-15 PROCEDURE — 73502 X-RAY EXAM HIP UNI 2-3 VIEWS: CPT

## 2025-01-15 PROCEDURE — 25510000001 IOPAMIDOL PER 1 ML: Performed by: EMERGENCY MEDICINE

## 2025-01-15 PROCEDURE — 25010000002 HYDROMORPHONE 1 MG/ML SOLUTION: Performed by: EMERGENCY MEDICINE

## 2025-01-15 PROCEDURE — 96374 THER/PROPH/DIAG INJ IV PUSH: CPT

## 2025-01-15 PROCEDURE — 74177 CT ABD & PELVIS W/CONTRAST: CPT

## 2025-01-15 PROCEDURE — 25010000002 ONDANSETRON PER 1 MG: Performed by: EMERGENCY MEDICINE

## 2025-01-15 PROCEDURE — 85025 COMPLETE CBC W/AUTO DIFF WBC: CPT | Performed by: EMERGENCY MEDICINE

## 2025-01-15 PROCEDURE — 80053 COMPREHEN METABOLIC PANEL: CPT | Performed by: EMERGENCY MEDICINE

## 2025-01-15 PROCEDURE — 83690 ASSAY OF LIPASE: CPT | Performed by: EMERGENCY MEDICINE

## 2025-01-15 PROCEDURE — 96375 TX/PRO/DX INJ NEW DRUG ADDON: CPT

## 2025-01-15 RX ORDER — CEPHALEXIN 500 MG/1
500 CAPSULE ORAL 3 TIMES DAILY
Qty: 21 CAPSULE | Refills: 0 | Status: SHIPPED | OUTPATIENT
Start: 2025-01-15

## 2025-01-15 RX ORDER — ONDANSETRON 2 MG/ML
4 INJECTION INTRAMUSCULAR; INTRAVENOUS ONCE
Status: COMPLETED | OUTPATIENT
Start: 2025-01-15 | End: 2025-01-15

## 2025-01-15 RX ORDER — SODIUM CHLORIDE 0.9 % (FLUSH) 0.9 %
10 SYRINGE (ML) INJECTION AS NEEDED
Status: DISCONTINUED | OUTPATIENT
Start: 2025-01-15 | End: 2025-01-15 | Stop reason: HOSPADM

## 2025-01-15 RX ORDER — IOPAMIDOL 755 MG/ML
100 INJECTION, SOLUTION INTRAVASCULAR
Status: COMPLETED | OUTPATIENT
Start: 2025-01-15 | End: 2025-01-15

## 2025-01-15 RX ORDER — HYDROCODONE BITARTRATE AND ACETAMINOPHEN 7.5; 325 MG/1; MG/1
1 TABLET ORAL EVERY 6 HOURS PRN
Qty: 10 TABLET | Refills: 0 | Status: SHIPPED | OUTPATIENT
Start: 2025-01-15

## 2025-01-15 RX ADMIN — IOPAMIDOL 100 ML: 755 INJECTION, SOLUTION INTRAVENOUS at 10:51

## 2025-01-15 RX ADMIN — HYDROMORPHONE HYDROCHLORIDE 1 MG: 1 INJECTION, SOLUTION INTRAMUSCULAR; INTRAVENOUS; SUBCUTANEOUS at 10:29

## 2025-01-15 RX ADMIN — ONDANSETRON 4 MG: 2 INJECTION INTRAMUSCULAR; INTRAVENOUS at 10:29

## 2025-01-15 NOTE — DISCHARGE INSTRUCTIONS
The good news is that the CT scan of your head and of your abdomen and pelvis and back showed no internal injuries or fractures but just a lot of bruising and swelling in your hip and lower abdomen and pelvis from your fall.    Your x-rays of the hip look good as well.    This will just take time and rest to heal, and a few days of pain medicine.

## 2025-01-15 NOTE — ED PROVIDER NOTES
Time: 10:22 AM EST  Date of encounter:  1/15/2025  Independent Historian/Clinical History and Information was obtained by:   Patient and Nursing Staff    History is limited by: N/A    Chief Complaint: Fall downstairs, right hip and abdominal and back injury, fall on ice      History of Present Illness:  Patient is a 44 y.o. year old female who presents to the emergency department for evaluation of injuries from falling down a flight of stairs 5 days ago including right sided hip bruising and injury and right lower abdominal pelvic pain, low back pain.    She also slipped on the ice and fell and hit her head and now has a bad headache yesterday.    She is on Plavix daily which makes her bruise.          Patient Care Team  Primary Care Provider: Moustapha Zhang MD    Past Medical History:     Allergies   Allergen Reactions    Egg-Derived Products Nausea And Vomiting    Emetrol Unknown - High Severity    Ketorolac Tromethamine Hives    Latex Hives    Lecithin Hives    Mometasone Furoate Other (See Comments)     Lactose intolerance    Penicillins Swelling    Sulfa Antibiotics Hives    Tramadol Hives     Past Medical History:   Diagnosis Date    Asthma     Chest pain     COPD (chronic obstructive pulmonary disease)     Depression     Diabetes mellitus     GERD (gastroesophageal reflux disease)     Hyperlipidemia     Hypertension     Kidney stone     Palpitation     Stroke      Past Surgical History:   Procedure Laterality Date    HERNIA REPAIR      HYSTERECTOMY      TUBAL ABDOMINAL LIGATION       Family History   Problem Relation Age of Onset    Hypertension Mother     Hypertension Father     Stroke Paternal Aunt     Heart attack Paternal Uncle     Hypertension Maternal Grandmother     Hypertension Paternal Grandmother     Heart disease Paternal Grandmother     Hypertension Paternal Grandfather     Heart disease Paternal Grandfather     Heart attack Paternal Grandfather        Home Medications:  Prior to Admission  "medications    Medication Sig Start Date End Date Taking? Authorizing Provider   albuterol (ACCUNEB) 1.25 MG/3ML nebulizer solution  5/13/21   Margarita Brown MD   Atorvastatin Calcium (LIPITOR PO) Take  by mouth.    Margarita Brown MD   bisoprolol-hydrochlorothiazide (ZIAC) 10-6.25 MG per tablet TAKE ONE TABLET BY MOUTH DAILY 4/17/23   Cecelia Harrison MD   cetirizine (zyrTEC) 10 MG tablet  6/10/21   Margarita Brown MD   cyclobenzaprine (FLEXERIL) 10 MG tablet Take 1 tablet by mouth.    Margarita Brown MD   doxepin (SINEquan) 25 MG capsule  5/29/21   Margarita Brown MD   HYDROcodone-acetaminophen (NORCO) 7.5-325 MG per tablet  2/19/21   Margarita Brown MD   mupirocin (BACTROBAN) 2 % ointment  6/19/21   Margarita Brown MD   pramipexole (MIRAPEX) 0.5 MG tablet  7/6/21   Margarita Brown MD   pregabalin (LYRICA) 150 MG capsule  6/15/21   Margarita Brown MD   SUMAtriptan (IMITREX) 50 MG tablet  5/14/21   Margarita Brown MD   venlafaxine XR (EFFEXOR-XR) 150 MG 24 hr capsule  3/3/21   Margarita Brown MD   vitamin D (ERGOCALCIFEROL) 1.25 MG (83185 UT) capsule capsule  7/4/21   Margarita Brown MD        Social History:   Social History     Tobacco Use    Smoking status: Every Day     Types: Cigarettes    Smokeless tobacco: Never   Vaping Use    Vaping status: Never Used   Substance Use Topics    Alcohol use: Never    Drug use: Never         Review of Systems:  Review of Systems   I performed a 10 point review of systems which was all negative, except for the positives found in the HPI above.    Physical Exam:  /81   Pulse 76   Temp 98.1 °F (36.7 °C)   Resp 14   Ht 162.6 cm (64\")   Wt 80 kg (176 lb 5.9 oz)   SpO2 96%   BMI 30.27 kg/m²           Physical Exam   General: Awake alert and in moderate distress    HEENT: Head normocephalic atraumatic, eyes PERRLA EOMI, nose normal, oropharynx normal.    Neck: Supple full range of motion, no " meningismus, no lymphadenopathy    Heart: Regular rate and rhythm, no murmurs or rubs, 2+ radial pulses bilaterally    Lungs: Clear to auscultation bilaterally without wheezes or crackles, no respiratory distress    Abdomen: Soft, mild tenderness in the right lower quadrant and right pelvis, nondistended, no rebound or guarding    Back: Pain with range of motion, bruising over the spine    Skin: Warm, dry, no rash    Musculoskeletal: Mildly reduced range of motion in the right hip with bruising and tenderness present over the anterolateral right hip, no lower extremity edema    Neurologic: Oriented x3, no motor deficits no sensory deficits    Psychiatric: Mood appears stable, no psychosis            Medical Decision Making:      Comorbidities that affect care:    On Plavix with history of TIA/stroke    External Notes reviewed:    None      The following orders were placed and all results were independently analyzed by me:  Orders Placed This Encounter   Procedures    CT Abdomen Pelvis With Contrast    XR Hip With or Without Pelvis 2 - 3 View Right    CT Head Without Contrast    [facility] Draw    Comprehensive Metabolic Panel    Lipase    Urinalysis With Microscopic If Indicated (No Culture) - Urine, Clean Catch    CBC Auto Differential    Urinalysis, Microscopic Only - Urine, Clean Catch    NPO Diet NPO Type: Strict NPO    Undress & Gown    Insert Peripheral IV    CBC & Differential    Green Top (Gel)    Lavender Top    Gold Top - SST    Light Blue Top       Medications Given in the Emergency Department:  Medications   sodium chloride 0.9 % flush 10 mL (has no administration in time range)   HYDROmorphone (DILAUDID) injection 1 mg (1 mg Intravenous Given 1/15/25 1029)   ondansetron (ZOFRAN) injection 4 mg (4 mg Intravenous Given 1/15/25 1029)   iopamidol (ISOVUE-370) 76 % injection 100 mL (100 mL Intravenous Given 1/15/25 1051)        ED Course:         Labs:    Lab Results (last 24 hours)       Procedure Component  Value Units Date/Time    CBC & Differential [[id number]]  (Abnormal) Collected: 01/15/25 0944    Specimen: Blood Updated: 01/15/25 0958    Narrative:      The following orders were created for panel order CBC & Differential.  Procedure                               Abnormality         Status                     ---------                               -----------         ------                     CBC Auto Differential[[id number]]        Abnormal            Final result                 Please view results for these tests on the individual orders.    Comprehensive Metabolic Panel [[id number]] Collected: 01/15/25 0944    Specimen: Blood Updated: 01/15/25 1054     Glucose 80 mg/dL      BUN 6 mg/dL      Creatinine 0.60 mg/dL      Sodium 137 mmol/L      Potassium 4.7 mmol/L      Comment: Slight hemolysis detected by analyzer. Result may be falsely elevated.        Chloride 101 mmol/L      CO2 26.2 mmol/L      Calcium 9.2 mg/dL      Total Protein 7.2 g/dL      Albumin 4.4 g/dL      ALT (SGPT) 16 U/L      AST (SGOT) 19 U/L      Comment: Slight hemolysis detected by analyzer. Result may be falsely elevated.        Alkaline Phosphatase 49 U/L      Total Bilirubin 0.3 mg/dL      Globulin 2.8 gm/dL      A/G Ratio 1.6 g/dL      BUN/Creatinine Ratio 10.0     Anion Gap 9.8 mmol/L      eGFR 114.4 mL/min/1.73     Narrative:      GFR Categories in Chronic Kidney Disease (CKD)      GFR Category          GFR (mL/min/1.73)    Interpretation  G1                     90 or greater         Normal or high (1)  G2                      60-89                Mild decrease (1)  G3a                   45-59                Mild to moderate decrease  G3b                   30-44                Moderate to severe decrease  G4                    15-29                Severe decrease  G5                    14 or less           Kidney failure          (1)In the absence of evidence of kidney disease, neither GFR category G1 or G2 fulfill the criteria for  CKD.    eGFR calculation 2021 CKD-EPI creatinine equation, which does not include race as a factor    Lipase [[id number]]  (Normal) Collected: 01/15/25 0944    Specimen: Blood Updated: 01/15/25 1024     Lipase 23 U/L     CBC Auto Differential [[id number]]  (Abnormal) Collected: 01/15/25 0944    Specimen: Blood Updated: 01/15/25 0958     WBC 9.45 10*3/mm3      RBC 4.43 10*6/mm3      Hemoglobin 13.8 g/dL      Hematocrit 41.9 %      MCV 94.6 fL      MCH 31.2 pg      MCHC 32.9 g/dL      RDW 13.2 %      RDW-SD 45.6 fl      MPV 11.1 fL      Platelets 185 10*3/mm3      Neutrophil % 60.6 %      Lymphocyte % 32.6 %      Monocyte % 4.1 %      Eosinophil % 2.0 %      Basophil % 0.5 %      Immature Grans % 0.2 %      Neutrophils, Absolute 5.72 10*3/mm3      Lymphocytes, Absolute 3.08 10*3/mm3      Monocytes, Absolute 0.39 10*3/mm3      Eosinophils, Absolute 0.19 10*3/mm3      Basophils, Absolute 0.05 10*3/mm3      Immature Grans, Absolute 0.02 10*3/mm3      nRBC 0.0 /100 WBC     Urinalysis With Microscopic If Indicated (No Culture) - Urine, Clean Catch [[id number]]  (Abnormal) Collected: 01/15/25 1002    Specimen: Urine, Clean Catch Updated: 01/15/25 1013     Color, UA Yellow     Appearance, UA Cloudy     pH, UA 7.0     Specific Gravity, UA 1.013     Glucose, UA Negative     Ketones, UA Negative     Bilirubin, UA Negative     Blood, UA Negative     Protein, UA Negative     Leuk Esterase, UA Moderate (2+)     Nitrite, UA Negative     Urobilinogen, UA 0.2 E.U./dL    Urinalysis, Microscopic Only - Urine, Clean Catch [[id number]]  (Abnormal) Collected: 01/15/25 1002    Specimen: Urine, Clean Catch Updated: 01/15/25 1032     RBC, UA None Seen /HPF      WBC, UA 21-50 /HPF      Bacteria, UA None Seen /HPF      Squamous Epithelial Cells, UA 7-12 /HPF      Hyaline Casts, UA None Seen /LPF      Methodology Automated Microscopy             Imaging:    CT Abdomen Pelvis With Contrast    Result Date: 1/15/2025  CT ABDOMEN PELVIS W CONTRAST Date  of Exam: 1/15/2025 10:51 AM EST Indication: Eval right lower abdominal/pelvic pain from recent fall, lower back pain. On Plavix Comparison: None available. Technique: Axial CT images were obtained of the abdomen and pelvis after the uneventful intravenous administration of iodinated contrast. Reconstructed coronal and sagittal images were also obtained. Automated exposure control and iterative construction methods were used. Findings: LOWER THORAX: Free of active disease. LIVER: The liver is normal in size. No significant liver lesions.  BILIARY SYSTEM: The gallbladder is There is no biliary dilatation. SPLEEN: Spleen is normal size.  No focal splenic lesions. PANCREAS: No focal masses.  No pancreatic duct dilation.No surrounding fluid or inflammatory changes. ADRENAL GLANDS: There is a 3 cm right and 1.3 cm left adrenal nodule which are likely incidental adenomas but do not meet strict criteria for adenomas on this contrast-enhanced study. KIDNEYS, URETERS, BLADDER: No hydronephrosis.  No nephroureterolithiasis. No suspicious renal lesions.  Normal appearance of urinary bladder given the amount of distention. REPRODUCTIVE ORGANS: Status post hysterectomy. GI/BOWEL: No bowel wall thickening or focal bowel dilation. There is a redundant sigmoid colon. APPENDIX: The appendix  is normal. PERITONEUM: No free air or free fluid. LYMPH NODES: No pathologically enlarged lymph nodes. ABDOMINAL AORTA: No aneurysmal dilation.  SOFT TISSUES: no significant findings there is subcutaneous fat stranding along the lateral right hip and flank within the right oblique abdominal wall musculature (image 115 series 3), there is a 2.4 x 1 cm convexity likely a small hematoma. BONES: No acute bony abnormality or  aggressive focal lytic or sclerotic osseous lesions.     Impression: 1.Fat stranding/contusion in the subcutaneous fat adjacent to the right hip and small probable hematoma along the right oblique abdominal wall musculature  measuring approximately 2.5 x 1.1 cm. Otherwise no acute intra-abdominal or intrapelvic abnormalities. 2.3 cm right and 1.3 cm left adrenal nodules. In the absence of known malignancy, these are most likely incidental adenomas. Electronically Signed: Jonathon HurtadoDO  1/15/2025 11:51 AM EST  Workstation ID: [id number]    CT Head Without Contrast    Result Date: 1/15/2025  CT HEAD WO CONTRAST Date of Exam: 1/15/2025 10:46 AM EST Indication: Fall, hit head on ice, headache. Comparison: None available. Technique: Axial CT images were obtained of the head without contrast administration.  Reconstructed coronal and sagittal images were also obtained. Automated exposure control and iterative construction methods were used. Findings: There is no CT evidence of acute hemorrhage, infarct, mass, mass effect, or midline shift. The gray-white matter differentiation is preserved. There is no hydrocephalus. The sulci and ventricles are symmetric.  No extraaxial fluid collections. The globes  and orbital contents are normal and symmetric. The mastoid air cells are clear. There is mild mucosal thickening in the sphenoid sinuses and right maxillary sinus. No skull fractures or suspicious calvarial lesions.     Impression: Mild mucosal thickening in the sphenoid sinuses and right maxillary sinus. No acute intracranial abnormality. Electronically Signed: Jonathon DO Bryant  1/15/2025 11:03 AM EST  Workstation ID: [id number]    XR Hip With or Without Pelvis 2 - 3 View Right    Result Date: 1/15/2025  XR HIP W OR WO PELVIS 2-3 VIEW RIGHT Date of Exam: 1/15/2025 10:32 AM EST Indication: Fall onto the right hip, pain in hip and pelvis Comparison: None available. Findings: The pelvic ring is intact. There is mild hip joint space narrowing bilaterally. No acute fractures dislocations or joint subluxations. No significant bony hypertrophy. The femoral head and neck contours are preserved. No focal osseous lesions. No erosions. The SI joints are  patent and symmetric. The pubic symphysis is patent and symmetric.     Impression: Minimal degenerative joint disease in the hips. No acute bony abnormality. Electronically Signed: Jonathonheather Hurtado DO  1/15/2025 10:45 AM EST  Workstation ID: [id number]       Differential Diagnosis and Discussion:    Differential diagnosis for this patient's head injury includes contusion, concussion, intracranial hemorrhage or hematoma, skull fracture.  Back Pain: The patient presents with back pain. My differential diagnosis includes but is not limited to acute spinal epidural abscess, acute spinal epidural bleed, cauda equina syndrome, abdominal aortic aneurysm, aortic dissection, kidney stone, pyelonephritis, musculoskeletal back pain, spinal fracture, and osteoarthritis.   Orthopedic Injuries: Differential diagnosis includes but is not limited to fractures, soft tissue injuries, dislocations, contusions, ligamentous injuries, tendon injuries, nerve injuries, compartment syndrome, bursitis, and vascular injuries.    PROCEDURES:    Labs were collected in the emergency department and all labs were reviewed and interpreted by me.  X-ray were performed in the emergency department and all X-ray impressions were independently interpreted by me.  CT scan was performed in the emergency department and the CT scan radiology impression was interpreted by me.    No orders to display       Procedures    MDM       This patient is a 44-year-old female the fell down some stairs and slipped on the ice injuring her right hip, right side of the lower abdominal pelvic area, low back, head injury.    I am giving her pain and nausea meds, checking labs to rule out internal bleeding or blood loss, getting CT of the head and CT abdomen pelvis and dedicated x-rays of the right hip to rule out orthopedic injuries.    If all of her studies come back negative for acute findings, I will diagnose her with some contusion and bruising and she will be given some  pain meds and    I will also have her skip her Plavix today given some presence of hematoma seen on the CT.    Finally looks like she may have a UTI and reports having some urinary frequency so we will start her on Keflex this week.            Patient Care Considerations:          Consultants/Shared Management Plan:        Social Determinants of Health:    Patient is independent, reliable, and has access to care.       Disposition and Care Coordination:    Discharged: The patient is suitable and stable for discharge with no need for consideration of admission.    I have explained the patient´s condition, diagnoses and treatment plan based on the information available to me at this time. I have answered questions and addressed any concerns. The patient has a good  understanding of the patient´s diagnosis, condition, and treatment plan as can be expected at this point. The vital signs have been stable. The patient´s condition is stable and appropriate for discharge from the emergency department.      The patient will pursue further outpatient evaluation with the primary care physician or other designated or consulting physician as outlined in the discharge instructions. They are agreeable to this plan of care and follow-up instructions have been explained in detail. The patient has received these instructions in written format and has expressed an understanding of the discharge instructions. The patient is aware that any significant change in condition or worsening of symptoms should prompt an immediate return to this or the closest emergency department or call to 911.  I have explained discharge medications and the need for follow up with the patient/caretakers. This was also printed in the discharge instructions. Patient was discharged with the following medications and follow up:      Medication List        New Prescriptions      cephalexin 500 MG capsule  Commonly known as: KEFLEX  Take 1 capsule by mouth 3  (Three) Times a Day.            Changed      * HYDROcodone-acetaminophen 7.5-325 MG per tablet  Commonly known as: NORCO  What changed: Another medication with the same name was added. Make sure you understand how and when to take each.     * HYDROcodone-acetaminophen 7.5-325 MG per tablet  Commonly known as: NORCO  Take 1 tablet by mouth Every 6 (Six) Hours As Needed for Severe Pain.  What changed: You were already taking a medication with the same name, and this prescription was added. Make sure you understand how and when to take each.           * This list has 2 medication(s) that are the same as other medications prescribed for you. Read the directions carefully, and ask your doctor or other care provider to review them with you.                   Where to Get Your Medications        These medications were sent to [address] [phone] [facility] [phone] [address] [address]      Phone: [phone]   cephalexin 500 MG capsule  HYDROcodone-acetaminophen 7.5-325 MG per tablet      Moustapha Zhang MD  [address] [facility] [place] [address] [phone]    Call in 3 days  As needed, If symptoms worsen, for a follow-up appointment       Final diagnoses:   Fall down stairs, initial encounter   Contusion of right hip, initial encounter   Abdominal wall hematoma, initial encounter   Acute lower UTI (urinary tract infection)        ED Disposition       ED Disposition   Discharge    Condition   Stable    Comment   --               This medical record created using voice recognition software.             Feliberto Lorenzo MD  01/15/25 121       Feliberto Lorenzo MD  01/15/25 [id number]

## 2025-01-15 NOTE — Clinical Note
[facility] EMERGENCY ROOM  [address] [place] [address]  Phone: [phone]  Fax: [phone]    Jesi Reyes was seen and treated in our emergency department on 1/15/2025.  She may return to work on 01/19/2025.         Thank you for choosing [facility].    Feliberto Lorenzo MD

## 2025-01-20 LAB
QT INTERVAL: 340 MS
QTC INTERVAL: 424 MS

## 2025-03-29 ENCOUNTER — HOSPITAL ENCOUNTER (EMERGENCY)
Facility: HOSPITAL | Age: 45
Discharge: HOME | End: 2025-03-29
Attending: EMERGENCY MEDICINE | Admitting: STUDENT IN AN ORGANIZED HEALTH CARE EDUCATION/TRAINING PROGRAM
Payer: COMMERCIAL

## 2025-03-29 ENCOUNTER — ANESTHESIA EVENT (OUTPATIENT)
Dept: PERIOP | Facility: HOSPITAL | Age: 45
End: 2025-03-29
Payer: COMMERCIAL

## 2025-03-29 ENCOUNTER — ANESTHESIA (OUTPATIENT)
Dept: PERIOP | Facility: HOSPITAL | Age: 45
End: 2025-03-29
Payer: COMMERCIAL

## 2025-03-29 ENCOUNTER — APPOINTMENT (OUTPATIENT)
Dept: CT IMAGING | Facility: HOSPITAL | Age: 45
End: 2025-03-29
Payer: COMMERCIAL

## 2025-03-29 VITALS
DIASTOLIC BLOOD PRESSURE: 76 MMHG | SYSTOLIC BLOOD PRESSURE: 127 MMHG | HEIGHT: 64 IN | WEIGHT: 166.67 LBS | TEMPERATURE: 97.9 F | HEART RATE: 96 BPM | BODY MASS INDEX: 28.45 KG/M2 | RESPIRATION RATE: 15 BRPM | OXYGEN SATURATION: 95 %

## 2025-03-29 DIAGNOSIS — K35.80 ACUTE APPENDICITIS, UNSPECIFIED ACUTE APPENDICITIS TYPE: Primary | ICD-10-CM

## 2025-03-29 LAB
ALBUMIN SERPL-MCNC: 4.1 G/DL (ref 3.5–5.2)
ALBUMIN/GLOB SERPL: 1.5 G/DL
ALP SERPL-CCNC: 44 U/L (ref 39–117)
ALT SERPL W P-5'-P-CCNC: 13 U/L (ref 1–33)
ANION GAP SERPL CALCULATED.3IONS-SCNC: 11.1 MMOL/L (ref 5–15)
AST SERPL-CCNC: 14 U/L (ref 1–32)
BACTERIA UR QL AUTO: ABNORMAL /HPF
BASOPHILS # BLD AUTO: 0.04 10*3/MM3 (ref 0–0.2)
BASOPHILS NFR BLD AUTO: 0.3 % (ref 0–1.5)
BILIRUB SERPL-MCNC: 0.2 MG/DL (ref 0–1.2)
BILIRUB UR QL STRIP: ABNORMAL
BUN SERPL-MCNC: 9 MG/DL (ref 6–20)
BUN/CREAT SERPL: 9 (ref 7–25)
CALCIUM SPEC-SCNC: 8.8 MG/DL (ref 8.6–10.5)
CHLORIDE SERPL-SCNC: 102 MMOL/L (ref 98–107)
CLARITY UR: CLEAR
CO2 SERPL-SCNC: 22.9 MMOL/L (ref 22–29)
COLOR UR: ABNORMAL
CREAT SERPL-MCNC: 1 MG/DL (ref 0.57–1)
D-LACTATE SERPL-SCNC: 1.4 MMOL/L (ref 0.5–2)
DEPRECATED RDW RBC AUTO: 44.6 FL (ref 37–54)
EGFRCR SERPLBLD CKD-EPI 2021: 71.8 ML/MIN/1.73
EOSINOPHIL # BLD AUTO: 0.17 10*3/MM3 (ref 0–0.4)
EOSINOPHIL NFR BLD AUTO: 1.4 % (ref 0.3–6.2)
ERYTHROCYTE [DISTWIDTH] IN BLOOD BY AUTOMATED COUNT: 13.1 % (ref 12.3–15.4)
GLOBULIN UR ELPH-MCNC: 2.8 GM/DL
GLUCOSE BLDC GLUCOMTR-MCNC: 130 MG/DL (ref 70–99)
GLUCOSE SERPL-MCNC: 101 MG/DL (ref 65–99)
GLUCOSE UR STRIP-MCNC: ABNORMAL MG/DL
HCT VFR BLD AUTO: 41.7 % (ref 34–46.6)
HGB BLD-MCNC: 14.3 G/DL (ref 12–15.9)
HGB UR QL STRIP.AUTO: ABNORMAL
HOLD SPECIMEN: NORMAL
HYALINE CASTS UR QL AUTO: ABNORMAL /LPF
IMM GRANULOCYTES # BLD AUTO: 0.03 10*3/MM3 (ref 0–0.05)
IMM GRANULOCYTES NFR BLD AUTO: 0.2 % (ref 0–0.5)
KETONES UR QL STRIP: ABNORMAL
LEUKOCYTE ESTERASE UR QL STRIP.AUTO: ABNORMAL
LIPASE SERPL-CCNC: 20 U/L (ref 13–60)
LYMPHOCYTES # BLD AUTO: 2.57 10*3/MM3 (ref 0.7–3.1)
LYMPHOCYTES NFR BLD AUTO: 20.8 % (ref 19.6–45.3)
MCH RBC QN AUTO: 31.9 PG (ref 26.6–33)
MCHC RBC AUTO-ENTMCNC: 34.3 G/DL (ref 31.5–35.7)
MCV RBC AUTO: 93.1 FL (ref 79–97)
MONOCYTES # BLD AUTO: 0.59 10*3/MM3 (ref 0.1–0.9)
MONOCYTES NFR BLD AUTO: 4.8 % (ref 5–12)
NEUTROPHILS NFR BLD AUTO: 72.5 % (ref 42.7–76)
NEUTROPHILS NFR BLD AUTO: 8.93 10*3/MM3 (ref 1.7–7)
NITRITE UR QL STRIP: ABNORMAL
NRBC BLD AUTO-RTO: 0 /100 WBC (ref 0–0.2)
PH UR STRIP.AUTO: ABNORMAL [PH]
PLATELET # BLD AUTO: 148 10*3/MM3 (ref 140–450)
PMV BLD AUTO: 11.7 FL (ref 6–12)
POTASSIUM SERPL-SCNC: 3.5 MMOL/L (ref 3.5–5.2)
PROT SERPL-MCNC: 6.9 G/DL (ref 6–8.5)
PROT UR QL STRIP: ABNORMAL
RBC # BLD AUTO: 4.48 10*6/MM3 (ref 3.77–5.28)
RBC # UR STRIP: ABNORMAL /HPF
RBC MORPH BLD: NORMAL
REF LAB TEST METHOD: ABNORMAL
RENAL EPI CELLS #/AREA URNS HPF: ABNORMAL /HPF
SMALL PLATELETS BLD QL SMEAR: ADEQUATE
SODIUM SERPL-SCNC: 136 MMOL/L (ref 136–145)
SP GR UR STRIP: <=1.005 (ref 1–1.03)
SQUAMOUS #/AREA URNS HPF: ABNORMAL /HPF
TRANS CELLS #/AREA URNS HPF: ABNORMAL /HPF
UROBILINOGEN UR QL STRIP: ABNORMAL
WBC # UR STRIP: ABNORMAL /HPF
WBC MORPH BLD: NORMAL
WBC NRBC COR # BLD AUTO: 12.33 10*3/MM3 (ref 3.4–10.8)
WHOLE BLOOD HOLD COAG: NORMAL
WHOLE BLOOD HOLD SPECIMEN: NORMAL

## 2025-03-29 PROCEDURE — 58661 LAPAROSCOPY REMOVE ADNEXA: CPT

## 2025-03-29 PROCEDURE — 25010000002 PROPOFOL 10 MG/ML EMULSION: Performed by: ANESTHESIOLOGY

## 2025-03-29 PROCEDURE — 25010000002 CEFTRIAXONE PER 250 MG

## 2025-03-29 PROCEDURE — 99284 EMERGENCY DEPT VISIT MOD MDM: CPT | Performed by: STUDENT IN AN ORGANIZED HEALTH CARE EDUCATION/TRAINING PROGRAM

## 2025-03-29 PROCEDURE — 87040 BLOOD CULTURE FOR BACTERIA: CPT | Performed by: EMERGENCY MEDICINE

## 2025-03-29 PROCEDURE — 58661 LAPAROSCOPY REMOVE ADNEXA: CPT | Performed by: STUDENT IN AN ORGANIZED HEALTH CARE EDUCATION/TRAINING PROGRAM

## 2025-03-29 PROCEDURE — 25010000002 ONDANSETRON PER 1 MG: Performed by: ANESTHESIOLOGY

## 2025-03-29 PROCEDURE — 25510000001 IOPAMIDOL PER 1 ML: Performed by: EMERGENCY MEDICINE

## 2025-03-29 PROCEDURE — 25010000002 PHENYLEPHRINE 10 MG/ML SOLUTION: Performed by: ANESTHESIOLOGY

## 2025-03-29 PROCEDURE — 88342 IMHCHEM/IMCYTCHM 1ST ANTB: CPT | Performed by: STUDENT IN AN ORGANIZED HEALTH CARE EDUCATION/TRAINING PROGRAM

## 2025-03-29 PROCEDURE — 81001 URINALYSIS AUTO W/SCOPE: CPT | Performed by: EMERGENCY MEDICINE

## 2025-03-29 PROCEDURE — 80053 COMPREHEN METABOLIC PANEL: CPT | Performed by: EMERGENCY MEDICINE

## 2025-03-29 PROCEDURE — 85025 COMPLETE CBC W/AUTO DIFF WBC: CPT | Performed by: EMERGENCY MEDICINE

## 2025-03-29 PROCEDURE — 88307 TISSUE EXAM BY PATHOLOGIST: CPT | Performed by: STUDENT IN AN ORGANIZED HEALTH CARE EDUCATION/TRAINING PROGRAM

## 2025-03-29 PROCEDURE — 85007 BL SMEAR W/DIFF WBC COUNT: CPT | Performed by: EMERGENCY MEDICINE

## 2025-03-29 PROCEDURE — 83605 ASSAY OF LACTIC ACID: CPT | Performed by: EMERGENCY MEDICINE

## 2025-03-29 PROCEDURE — 82948 REAGENT STRIP/BLOOD GLUCOSE: CPT | Performed by: ANESTHESIOLOGY

## 2025-03-29 PROCEDURE — 25010000002 DEXAMETHASONE PER 1 MG: Performed by: ANESTHESIOLOGY

## 2025-03-29 PROCEDURE — 74177 CT ABD & PELVIS W/CONTRAST: CPT

## 2025-03-29 PROCEDURE — 96374 THER/PROPH/DIAG INJ IV PUSH: CPT

## 2025-03-29 PROCEDURE — 88304 TISSUE EXAM BY PATHOLOGIST: CPT | Performed by: STUDENT IN AN ORGANIZED HEALTH CARE EDUCATION/TRAINING PROGRAM

## 2025-03-29 PROCEDURE — 25010000002 LIDOCAINE 1% - EPINEPHRINE 1:100000 1 %-1:100000 SOLUTION: Performed by: STUDENT IN AN ORGANIZED HEALTH CARE EDUCATION/TRAINING PROGRAM

## 2025-03-29 PROCEDURE — 96375 TX/PRO/DX INJ NEW DRUG ADDON: CPT

## 2025-03-29 PROCEDURE — 36415 COLL VENOUS BLD VENIPUNCTURE: CPT

## 2025-03-29 PROCEDURE — 99285 EMERGENCY DEPT VISIT HI MDM: CPT

## 2025-03-29 PROCEDURE — 25010000002 ONDANSETRON PER 1 MG: Performed by: EMERGENCY MEDICINE

## 2025-03-29 PROCEDURE — 25010000002 LIDOCAINE PF 2% 2 % SOLUTION: Performed by: ANESTHESIOLOGY

## 2025-03-29 PROCEDURE — 25010000002 MIDAZOLAM PER 1MG: Performed by: ANESTHESIOLOGY

## 2025-03-29 PROCEDURE — 83690 ASSAY OF LIPASE: CPT | Performed by: EMERGENCY MEDICINE

## 2025-03-29 PROCEDURE — 25810000003 LACTATED RINGERS PER 1000 ML: Performed by: ANESTHESIOLOGY

## 2025-03-29 PROCEDURE — 25010000002 FENTANYL CITRATE (PF) 50 MCG/ML SOLUTION: Performed by: ANESTHESIOLOGY

## 2025-03-29 PROCEDURE — 44970 LAPAROSCOPY APPENDECTOMY: CPT

## 2025-03-29 PROCEDURE — 44970 LAPAROSCOPY APPENDECTOMY: CPT | Performed by: STUDENT IN AN ORGANIZED HEALTH CARE EDUCATION/TRAINING PROGRAM

## 2025-03-29 PROCEDURE — 25010000002 HYDROMORPHONE 1 MG/ML SOLUTION: Performed by: EMERGENCY MEDICINE

## 2025-03-29 PROCEDURE — 25010000002 SUGAMMADEX 200 MG/2ML SOLUTION: Performed by: ANESTHESIOLOGY

## 2025-03-29 PROCEDURE — 25010000002 CEFAZOLIN PER 500 MG: Performed by: ANESTHESIOLOGY

## 2025-03-29 DEVICE — ENDOPATH ECHELON ENDOSCOPIC LINEAR CUTTER RELOADS, WHITE, 60MM
Type: IMPLANTABLE DEVICE | Site: ASCENDING COLON | Status: FUNCTIONAL
Brand: ECHELON ENDOPATH

## 2025-03-29 RX ORDER — SODIUM CHLORIDE, SODIUM LACTATE, POTASSIUM CHLORIDE, CALCIUM CHLORIDE 600; 310; 30; 20 MG/100ML; MG/100ML; MG/100ML; MG/100ML
9 INJECTION, SOLUTION INTRAVENOUS CONTINUOUS PRN
Status: DISCONTINUED | OUTPATIENT
Start: 2025-03-29 | End: 2025-03-29 | Stop reason: HOSPADM

## 2025-03-29 RX ORDER — PROMETHAZINE HYDROCHLORIDE 25 MG/1
25 TABLET ORAL ONCE AS NEEDED
Status: DISCONTINUED | OUTPATIENT
Start: 2025-03-29 | End: 2025-03-29 | Stop reason: HOSPADM

## 2025-03-29 RX ORDER — FENTANYL CITRATE 50 UG/ML
INJECTION, SOLUTION INTRAMUSCULAR; INTRAVENOUS AS NEEDED
Status: DISCONTINUED | OUTPATIENT
Start: 2025-03-29 | End: 2025-03-29 | Stop reason: SURG

## 2025-03-29 RX ORDER — ONDANSETRON 2 MG/ML
4 INJECTION INTRAMUSCULAR; INTRAVENOUS ONCE
Status: COMPLETED | OUTPATIENT
Start: 2025-03-29 | End: 2025-03-29

## 2025-03-29 RX ORDER — MIDAZOLAM HYDROCHLORIDE 2 MG/2ML
2 INJECTION, SOLUTION INTRAMUSCULAR; INTRAVENOUS ONCE
Status: COMPLETED | OUTPATIENT
Start: 2025-03-29 | End: 2025-03-29

## 2025-03-29 RX ORDER — PHENYLEPHRINE HYDROCHLORIDE 10 MG/ML
INJECTION INTRAVENOUS AS NEEDED
Status: DISCONTINUED | OUTPATIENT
Start: 2025-03-29 | End: 2025-03-29 | Stop reason: SURG

## 2025-03-29 RX ORDER — LIDOCAINE HYDROCHLORIDE 20 MG/ML
INJECTION, SOLUTION EPIDURAL; INFILTRATION; INTRACAUDAL; PERINEURAL AS NEEDED
Status: DISCONTINUED | OUTPATIENT
Start: 2025-03-29 | End: 2025-03-29 | Stop reason: SURG

## 2025-03-29 RX ORDER — ONDANSETRON 2 MG/ML
4 INJECTION INTRAMUSCULAR; INTRAVENOUS ONCE AS NEEDED
Status: DISCONTINUED | OUTPATIENT
Start: 2025-03-29 | End: 2025-03-29 | Stop reason: HOSPADM

## 2025-03-29 RX ORDER — DEXMEDETOMIDINE HYDROCHLORIDE 100 UG/ML
INJECTION, SOLUTION INTRAVENOUS AS NEEDED
Status: DISCONTINUED | OUTPATIENT
Start: 2025-03-29 | End: 2025-03-29 | Stop reason: SURG

## 2025-03-29 RX ORDER — SUCCINYLCHOLINE/SOD CL,ISO/PF 100 MG/5ML
SYRINGE (ML) INTRAVENOUS AS NEEDED
Status: DISCONTINUED | OUTPATIENT
Start: 2025-03-29 | End: 2025-03-29 | Stop reason: SURG

## 2025-03-29 RX ORDER — IOPAMIDOL 755 MG/ML
100 INJECTION, SOLUTION INTRAVASCULAR
Status: COMPLETED | OUTPATIENT
Start: 2025-03-29 | End: 2025-03-29

## 2025-03-29 RX ORDER — MAGNESIUM HYDROXIDE 1200 MG/15ML
LIQUID ORAL AS NEEDED
Status: DISCONTINUED | OUTPATIENT
Start: 2025-03-29 | End: 2025-03-29 | Stop reason: HOSPADM

## 2025-03-29 RX ORDER — LIDOCAINE HYDROCHLORIDE AND EPINEPHRINE 10; 10 MG/ML; UG/ML
INJECTION, SOLUTION INFILTRATION; PERINEURAL AS NEEDED
Status: DISCONTINUED | OUTPATIENT
Start: 2025-03-29 | End: 2025-03-29 | Stop reason: HOSPADM

## 2025-03-29 RX ORDER — PROMETHAZINE HYDROCHLORIDE 25 MG/1
25 SUPPOSITORY RECTAL ONCE AS NEEDED
Status: DISCONTINUED | OUTPATIENT
Start: 2025-03-29 | End: 2025-03-29 | Stop reason: HOSPADM

## 2025-03-29 RX ORDER — ACETAMINOPHEN 500 MG
1000 TABLET ORAL ONCE
Status: DISCONTINUED | OUTPATIENT
Start: 2025-03-29 | End: 2025-03-29 | Stop reason: HOSPADM

## 2025-03-29 RX ORDER — PROPOFOL 10 MG/ML
VIAL (ML) INTRAVENOUS AS NEEDED
Status: DISCONTINUED | OUTPATIENT
Start: 2025-03-29 | End: 2025-03-29 | Stop reason: SURG

## 2025-03-29 RX ORDER — ROCURONIUM BROMIDE 10 MG/ML
INJECTION, SOLUTION INTRAVENOUS AS NEEDED
Status: DISCONTINUED | OUTPATIENT
Start: 2025-03-29 | End: 2025-03-29 | Stop reason: SURG

## 2025-03-29 RX ORDER — SODIUM CHLORIDE 0.9 % (FLUSH) 0.9 %
10 SYRINGE (ML) INJECTION AS NEEDED
Status: DISCONTINUED | OUTPATIENT
Start: 2025-03-29 | End: 2025-03-30 | Stop reason: HOSPADM

## 2025-03-29 RX ORDER — OXYCODONE HYDROCHLORIDE 5 MG/1
5 TABLET ORAL
Status: DISCONTINUED | OUTPATIENT
Start: 2025-03-29 | End: 2025-03-29 | Stop reason: HOSPADM

## 2025-03-29 RX ORDER — CEFAZOLIN SODIUM 1 G/3ML
INJECTION, POWDER, FOR SOLUTION INTRAMUSCULAR; INTRAVENOUS AS NEEDED
Status: DISCONTINUED | OUTPATIENT
Start: 2025-03-29 | End: 2025-03-29 | Stop reason: SURG

## 2025-03-29 RX ORDER — DEXAMETHASONE SODIUM PHOSPHATE 4 MG/ML
INJECTION, SOLUTION INTRA-ARTICULAR; INTRALESIONAL; INTRAMUSCULAR; INTRAVENOUS; SOFT TISSUE AS NEEDED
Status: DISCONTINUED | OUTPATIENT
Start: 2025-03-29 | End: 2025-03-29 | Stop reason: SURG

## 2025-03-29 RX ORDER — ONDANSETRON 4 MG/1
4 TABLET, ORALLY DISINTEGRATING ORAL ONCE AS NEEDED
Status: DISCONTINUED | OUTPATIENT
Start: 2025-03-29 | End: 2025-03-30 | Stop reason: HOSPADM

## 2025-03-29 RX ORDER — OXYCODONE AND ACETAMINOPHEN 10; 325 MG/1; MG/1
1 TABLET ORAL EVERY 6 HOURS PRN
Qty: 15 TABLET | Refills: 0 | Status: SHIPPED | OUTPATIENT
Start: 2025-03-29 | End: 2025-04-02 | Stop reason: SDUPTHER

## 2025-03-29 RX ORDER — IPRATROPIUM BROMIDE AND ALBUTEROL SULFATE 2.5; .5 MG/3ML; MG/3ML
3 SOLUTION RESPIRATORY (INHALATION)
Status: DISCONTINUED | OUTPATIENT
Start: 2025-03-29 | End: 2025-03-29 | Stop reason: HOSPADM

## 2025-03-29 RX ORDER — METRONIDAZOLE 500 MG/100ML
500 INJECTION, SOLUTION INTRAVENOUS ONCE
Status: COMPLETED | OUTPATIENT
Start: 2025-03-29 | End: 2025-03-29

## 2025-03-29 RX ADMIN — SODIUM CHLORIDE, POTASSIUM CHLORIDE, SODIUM LACTATE AND CALCIUM CHLORIDE: 600; 310; 30; 20 INJECTION, SOLUTION INTRAVENOUS at 18:32

## 2025-03-29 RX ADMIN — DEXMEDETOMIDINE 20 MCG: 100 INJECTION, SOLUTION, CONCENTRATE INTRAVENOUS at 18:49

## 2025-03-29 RX ADMIN — Medication 100 MG: at 18:40

## 2025-03-29 RX ADMIN — PHENYLEPHRINE HYDROCHLORIDE 200 MCG: 10 INJECTION INTRAVENOUS at 19:18

## 2025-03-29 RX ADMIN — ONDANSETRON 4 MG: 2 INJECTION INTRAMUSCULAR; INTRAVENOUS at 16:26

## 2025-03-29 RX ADMIN — ROCURONIUM BROMIDE 5 MG: 10 INJECTION, SOLUTION INTRAVENOUS at 18:36

## 2025-03-29 RX ADMIN — IPRATROPIUM BROMIDE AND ALBUTEROL SULFATE 3 ML: .5; 3 SOLUTION RESPIRATORY (INHALATION) at 19:52

## 2025-03-29 RX ADMIN — CEFAZOLIN 2 G: 1 INJECTION, POWDER, FOR SOLUTION INTRAMUSCULAR; INTRAVENOUS; PARENTERAL at 18:50

## 2025-03-29 RX ADMIN — DEXAMETHASONE SODIUM PHOSPHATE 4 MG: 4 INJECTION, SOLUTION INTRAMUSCULAR; INTRAVENOUS at 18:41

## 2025-03-29 RX ADMIN — PROPOFOL 200 MG: 10 INJECTION, EMULSION INTRAVENOUS at 18:36

## 2025-03-29 RX ADMIN — METRONIDAZOLE 500 MG: 500 INJECTION, SOLUTION INTRAVENOUS at 18:42

## 2025-03-29 RX ADMIN — SUGAMMADEX 200 MG: 100 INJECTION, SOLUTION INTRAVENOUS at 19:27

## 2025-03-29 RX ADMIN — MIDAZOLAM HYDROCHLORIDE 2 MG: 1 INJECTION, SOLUTION INTRAMUSCULAR; INTRAVENOUS at 18:33

## 2025-03-29 RX ADMIN — IOPAMIDOL 100 ML: 755 INJECTION, SOLUTION INTRAVENOUS at 16:49

## 2025-03-29 RX ADMIN — FENTANYL CITRATE 100 MCG: 50 INJECTION, SOLUTION INTRAMUSCULAR; INTRAVENOUS at 18:35

## 2025-03-29 RX ADMIN — LIDOCAINE HYDROCHLORIDE 40 MG: 20 INJECTION, SOLUTION EPIDURAL; INFILTRATION; INTRACAUDAL; PERINEURAL at 18:34

## 2025-03-29 RX ADMIN — CEFTRIAXONE SODIUM 1000 MG: 1 INJECTION, POWDER, FOR SOLUTION INTRAMUSCULAR; INTRAVENOUS at 17:59

## 2025-03-29 RX ADMIN — OXYCODONE HYDROCHLORIDE 5 MG: 5 TABLET ORAL at 20:02

## 2025-03-29 RX ADMIN — DEXMEDETOMIDINE 20 MCG: 100 INJECTION, SOLUTION, CONCENTRATE INTRAVENOUS at 18:59

## 2025-03-29 RX ADMIN — HYDROMORPHONE HYDROCHLORIDE 1 MG: 1 INJECTION, SOLUTION INTRAMUSCULAR; INTRAVENOUS; SUBCUTANEOUS at 16:31

## 2025-03-29 RX ADMIN — ONDANSETRON 4 MG: 2 INJECTION INTRAMUSCULAR; INTRAVENOUS at 19:26

## 2025-03-29 NOTE — H&P
[facility]   GENERAL SURGERY  HISTORY AND PHYSICAL    Patient Name: Jesi Reyes  : 1980  MRN: [id number]  Primary Care Physician:  Alyssia Cowan APRN  Date of admission: 3/29/2025    Subjective     Chief Complaint:  abdominal pain    HPI:  Jesi Reyes is a 44 y.o. female who presents to the ED with a 1 day history of worsening right lower quadrant abdominal pain.  She has had some associated nausea without emesis.  She denies any fevers or chills.  She initially attributed her pain to suspected kidney stone but workup in the ED with CT abdomen pelvis was suggestive of nonperforated appendicitis.  Her abdominal surgical history includes a hernia repair, cholecystectomy, and total hysterectomy.    Personal History   Allergies:  Allergies   Allergen Reactions    Egg-Derived Products Nausea And Vomiting    Emetrol Unknown - High Severity    Ketorolac Tromethamine Hives    Latex Hives    Lecithin Hives    Mometasone Furoate Other (See Comments)     Lactose intolerance    Penicillins Swelling    Sulfa Antibiotics Hives    Tramadol Hives       Home Medications:  Prior to Admission medications    Medication Sig Start Date End Date Taking? Authorizing Provider   albuterol (ACCUNEB) 1.25 MG/3ML nebulizer solution  21   Margarita Brown MD   Atorvastatin Calcium (LIPITOR PO) Take  by mouth.    ProviderMargarita MD   bisoprolol-hydrochlorothiazide (ZIAC) 10-6.25 MG per tablet TAKE ONE TABLET BY MOUTH DAILY 23   Cecelia Harrison MD   cephalexin (KEFLEX) 500 MG capsule Take 1 capsule by mouth 3 (Three) Times a Day. 1/15/25   Feliberto Lorenzo MD   cetirizine (zyrTEC) 10 MG tablet  6/10/21   Margarita Brown MD   cyclobenzaprine (FLEXERIL) 10 MG tablet Take 1 tablet by mouth.    Margarita Brown MD   doxepin (SINEquan) 25 MG capsule  21   Margarita Brown MD   HYDROcodone-acetaminophen (NORCO) 7.5-325 MG per tablet  21   Margarita Brown MD    HYDROcodone-acetaminophen (NORCO) 7.5-325 MG per tablet Take 1 tablet by mouth Every 6 (Six) Hours As Needed for Severe Pain. 1/15/25   Feliberto Lorenzo MD   mupirocin (BACTROBAN) 2 % ointment  6/19/21   Margarita Brown MD   pramipexole (MIRAPEX) 0.5 MG tablet  7/6/21   Margarita Brown MD   pregabalin (LYRICA) 150 MG capsule  6/15/21   Margarita Brown MD   SUMAtriptan (IMITREX) 50 MG tablet  5/14/21   Margarita Brown MD   venlafaxine XR (EFFEXOR-XR) 150 MG 24 hr capsule  3/3/21   Margarita Brown MD   vitamin D (ERGOCALCIFEROL) 1.25 MG (62203 UT) capsule capsule  7/4/21   Margarita Brown MD       Past Medical History:   Diagnosis Date    Asthma     Chest pain     COPD (chronic obstructive pulmonary disease)     Depression     Diabetes mellitus     GERD (gastroesophageal reflux disease)     Hyperlipidemia     Hypertension     Kidney stone     Palpitation     Stroke        Past Surgical History:   Procedure Laterality Date    HERNIA REPAIR      HYSTERECTOMY      TUBAL ABDOMINAL LIGATION         Social History:  reports that she has been smoking cigarettes. She has never used smokeless tobacco. She reports that she does not drink alcohol and does not use drugs.    Family History: family history includes Heart attack in her paternal grandfather and paternal uncle; Heart disease in her paternal grandfather and paternal grandmother; Hypertension in her father, maternal grandmother, mother, paternal grandfather, and paternal grandmother; Stroke in her paternal aunt. Otherwise pertinent FHx was reviewed and not pertinent to current issue.    Review of Systems: Review of systems is noncontributory besides as mentioned in above HPI section.   All systems were reviewed and negative except for: nausea, abdominal pain    Objective   Vitals:   Temp:  [98.5 °F (36.9 °C)] 98.5 °F (36.9 °C)  Heart Rate:  [106] 106  Resp:  [20] 20  BP: (114)/(67) 114/67  Physical Exam   Constitutional: healthy  appearing, alert, no acute distress, reliable historian  HENT:  NCAT, no visible deformities or lesions  Eyes:  sclerae clear, conjunctivae clear, EOMI  Neck:  normal appearance, no masses, trachea midline  Respiratory:  breathing not labored, respiratory effort appears normal  Cardiovascular:  heart regular rate and rhythm  Abdomen:  soft, mild distention, right lower quadrant tenderness with Rovsing sign  Skin and subcutaneous tissue:  no visible concerning rashes or lesions, no jaundice  Musculoskeletal: moving all extremities symmetrically and purposefully  Neurologic:  no obvious motor or sensory deficits, cerebellar function without any obvious abnormalities, alert & oriented x 3, speech clear  Psychiatric:  judgment and insight intact, mood normal, affect appropriate, cooperative    CBC          12/23/2024    08:54 1/15/2025    09:44 3/29/2025    16:20   CBC   WBC 7.01  9.45  12.33    RBC 4.56  4.43  4.48    Hemoglobin 14.4  13.8  14.3    Hematocrit 43.1  41.9  41.7    MCV 94.5  94.6  93.1    MCH 31.6  31.2  31.9    MCHC 33.4  32.9  34.3    RDW 12.8  13.2  13.1    Platelets 176  185  148      CMP          12/23/2024    08:54 1/15/2025    09:44 3/29/2025    16:20   CMP   Glucose 78  80  101    BUN 4  6  9    Creatinine 0.81  0.60  1.00    EGFR 92.5  114.4  71.8    Sodium 135  137  136    Potassium 3.9  4.7  3.5    Chloride 100  101  102    Calcium 9.1  9.2  8.8    Total Protein 7.2  7.2  6.9    Albumin 4.2  4.4  4.1    Globulin 3.0  2.8  2.8    Total Bilirubin 0.3  0.3  0.2    Alkaline Phosphatase 49  49  44    AST (SGOT) 20  19  14    ALT (SGPT) 13  16  13    Albumin/Globulin Ratio 1.4  1.6  1.5    BUN/Creatinine Ratio 4.9  10.0  9.0    Anion Gap 9.8  9.8  11.1          Results from last 7 days   Lab Units 03/29/25  1620   WBC 10*3/mm3 12.33*   HEMOGLOBIN g/dL 14.3   HEMATOCRIT % 41.7   PLATELETS 10*3/mm3 148     Results from last 7 days   Lab Units 03/29/25  1620   SODIUM mmol/L 136   POTASSIUM mmol/L 3.5  "  CHLORIDE mmol/L 102   CO2 mmol/L 22.9   BUN mg/dL 9   CREATININE mg/dL 1.00   CALCIUM mg/dL 8.8   BILIRUBIN mg/dL 0.2   ALK PHOS U/L 44   ALT (SGPT) U/L 13   AST (SGOT) U/L 14   GLUCOSE mg/dL 101*         Lab Results   Lab Value Date/Time    LIPASE 20 03/29/2025 1620    LIPASE 23 01/15/2025 0944     No results found for: \"INR\"  No results found for: \"LACTATE\"    Radiology:  CT Abdomen Pelvis With Contrast   Final Result   Impression:      1. The base and majority of the appendix are normal in caliber but the tip is more prominent than on the previous examination measuring up to 10 mm. While there is no significant surrounding inflammatory change, early or mild tip appendicitis is not    excluded. Clinical and laboratory correlation are recommended.   2. Cholecystectomy.   3. Hysterectomy.   4. Bilateral low-density adrenal nodules are stable.             Electronically Signed: Sandy Fraire MD     3/29/2025 5:16 PM EDT     Workstation ID: [id number]             LABS:  Lab Results (last 48 hours)       Procedure Component Value Units Date/Time    Lactic Acid, Plasma [id number] Collected: 03/29/25 1620    Specimen: Blood Updated: 03/29/25 1735    Urinalysis With Microscopic If Indicated (No Culture) - Urine, Clean Catch [id number] (Abnormal) Collected: 03/29/25 1620    Specimen: Urine, Clean Catch Updated: 03/29/25 1658     Color, UA Parke     Appearance, UA Clear     pH, UA --     Comment: Result not available due to interfering substances.        Specific Gravity, UA <=1.005     Glucose, UA --     Comment: Result not available due to interfering substances.        Ketones, UA --     Comment: Result not available due to interfering substances.        Bilirubin, UA --     Comment: Result not available due to interfering substances.        Blood, UA --     Comment: Result not available due to interfering substances.        Protein, UA --     Comment: Result not available due to interfering substances.        Leuk " Esterase, UA --     Comment: Result not available due to interfering substances.        Nitrite, UA --     Comment: Result not available due to interfering substances.        Urobilinogen, UA --     Comment: Result not available due to interfering substances.       Comprehensive Metabolic Panel [id number] (Abnormal) Collected: 03/29/25 1620    Specimen: Blood Updated: 03/29/25 1652     Glucose 101 mg/dL      BUN 9 mg/dL      Creatinine 1.00 mg/dL      Sodium 136 mmol/L      Potassium 3.5 mmol/L      Chloride 102 mmol/L      CO2 22.9 mmol/L      Calcium 8.8 mg/dL      Total Protein 6.9 g/dL      Albumin 4.1 g/dL      ALT (SGPT) 13 U/L      AST (SGOT) 14 U/L      Alkaline Phosphatase 44 U/L      Total Bilirubin 0.2 mg/dL      Globulin 2.8 gm/dL      A/G Ratio 1.5 g/dL      BUN/Creatinine Ratio 9.0     Anion Gap 11.1 mmol/L      eGFR 71.8 mL/min/1.73     Narrative:      GFR Categories in Chronic Kidney Disease (CKD)      GFR Category          GFR (mL/min/1.73)    Interpretation  G1                     90 or greater         Normal or high (1)  G2                      60-89                Mild decrease (1)  G3a                   45-59                Mild to moderate decrease  G3b                   30-44                Moderate to severe decrease  G4                    15-29                Severe decrease  G5                    14 or less           Kidney failure          (1)In the absence of evidence of kidney disease, neither GFR category G1 or G2 fulfill the criteria for CKD.    eGFR calculation 2021 CKD-EPI creatinine equation, which does not include race as a factor    Lipase [id number] (Normal) Collected: 03/29/25 1620    Specimen: Blood Updated: 03/29/25 1652     Lipase 20 U/L     CBC & Differential [id number] (Abnormal) Collected: 03/29/25 1620    Specimen: Blood Updated: 03/29/25 1648    Narrative:      The following orders were created for panel order CBC & Differential.  Procedure                                Abnormality         Status                     ---------                               -----------         ------                     CBC Auto Differential [id number]        abnormal            Final result               Scan Slide [id number]                                       final result                 Please view results for these tests on the individual orders.    CBC Auto Differential [id number] (Abnormal) Collected: 03/29/25 1620    Specimen: Blood Updated: 03/29/25 1648     WBC 12.33 10*3/mm3      RBC 4.48 10*6/mm3      Hemoglobin 14.3 g/dL      Hematocrit 41.7 %      MCV 93.1 fL      MCH 31.9 pg      MCHC 34.3 g/dL      RDW 13.1 %      RDW-SD 44.6 fl      MPV 11.7 fL      Platelets 148 10*3/mm3      Neutrophil % 72.5 %      Lymphocyte % 20.8 %      Monocyte % 4.8 %      Eosinophil % 1.4 %      Basophil % 0.3 %      Immature Grans % 0.2 %      Neutrophils, Absolute 8.93 10*3/mm3      Lymphocytes, Absolute 2.57 10*3/mm3      Monocytes, Absolute 0.59 10*3/mm3      Eosinophils, Absolute 0.17 10*3/mm3      Basophils, Absolute 0.04 10*3/mm3      Immature Grans, Absolute 0.03 10*3/mm3      nRBC 0.0 /100 WBC     Scan Slide [id number] Collected: 03/29/25 1620    Specimen: Blood Updated: 03/29/25 1648     RBC Morphology Normal     WBC Morphology Normal     Platelet Estimate Adequate    Urinalysis, Microscopic Only - Urine, Clean Catch [id number] (Abnormal) Collected: 03/29/25 1620    Specimen: Urine, Clean Catch Updated: 03/29/25 1638     RBC, UA 0-2 /HPF      WBC, UA 3-5 /HPF      Bacteria, UA Trace /HPF      Squamous Epithelial Cells, UA 0-2 /HPF      Transitional Epithelial Cells, UA 3-6 /HPF      Renal Epithelial Cells, UA 0-2 /HPF      Hyaline Casts, UA None Seen /LPF      Methodology Automated Microscopy    [place] Draw [id number] Collected: 03/29/25 1620    Specimen: Blood Updated: 03/29/25 1631    Narrative:      The following orders were created for panel order [place] Draw.  Procedure                                Abnormality         Status                     ---------                               -----------         ------                     Green Top (Gel) [id number]                                  final result               Lavender Top [id number]                                     final result               Gold Top - SST [id number]                                   final result               Light Blue Top [id number]                                   final result                 Please view results for these tests on the individual orders.    Green Top (Gel) [id number] Collected: 03/29/25 1620    Specimen: Blood Updated: 03/29/25 1631     Extra Tube Hold for add-ons.     Comment: Auto resulted.       Lavender Top [[id number]] Collected: 03/29/25 1620    Specimen: Blood Updated: 03/29/25 1631     Extra Tube hold for add-on     Comment: Auto resulted       Gold Top - SST [id number] Collected: 03/29/25 1620    Specimen: Blood Updated: 03/29/25 1631     Extra Tube Hold for add-ons.     Comment: Auto resulted.       Light Blue Top [id number] Collected: 03/29/25 1620    Specimen: Blood Updated: 03/29/25 1631     Extra Tube Hold for add-ons.     Comment: Auto resulted       Extra Tubes [id number] Collected: 03/29/25 1620    Specimen: Blood Updated: 03/29/25 1631    Narrative:      The following orders were created for panel order Extra Tubes.  Procedure                               Abnormality         Status                     ---------                               -----------         ------                     [place] Top [id number]                                         final result                 Please view results for these tests on the individual orders.    [place] Top [id number] Collected: 03/29/25 1620    Specimen: Blood Updated: 03/29/25 1631     Extra Tube Hold for add-ons.     Comment: Auto resulted.               IMAGING:  Imaging Results (Last 48 Hours)       Procedure Component Value Units Date/Time    CT Abdomen  Pelvis With Contrast [[id number]] Collected: 03/29/25 1651     Updated: 03/29/25 1718    Narrative:      CT ABDOMEN PELVIS W CONTRAST    Date of Exam: 3/29/2025 4:43 PM EDT    Indication: R flank pain.    Comparison: 1/15/2025    Technique: Axial CT images were obtained of the abdomen and pelvis after the uneventful intravenous administration of iodinated contrast. Reconstructed coronal and sagittal images were also obtained. Automated exposure control and iterative construction   methods were used.      Findings:  LUNG BASES:  Unremarkable without mass or infiltrate.    LIVER: There is focal fatty infiltration adjacent to the falciform ligament.  BILIARY/GALLBLADDER: The gallbladder is surgically absent.  SPLEEN:  Unremarkable  PANCREAS:  Unremarkable  ADRENAL: There are bilateral low-density adrenal lesions which are similar to the previous study.  KIDNEYS:  Unremarkable parenchyma with no solid mass identified. No obstruction.  No calculus identified.  GASTROINTESTINAL/MESENTERY:  Small bowel loops are normal in caliber without obstruction or inflammation.  There is a redundant sigmoid colon. The majority of the appendix contains air and is normal in caliber; however, the tip of the appendix appears   more prominent than on the prior study and measures up to 10 mm in diameter with suggestion of mucosal thickening (series 2 image 152).  MESENTERIC VESSELS:  Patent.  AORTA/IVC:  Normal caliber.    RETROPERITONEUM/LYMPH NODES:  Unremarkable    REPRODUCTIVE: The patient is status post hysterectomy. There is no adnexal mass. Tiny bit of fluid in the pelvis.  BLADDER:  Unremarkable    OSSEUS STRUCTURES:  Typical for age with no acute process identified.        Impression:      Impression:    1. The base and majority of the appendix are normal in caliber but the tip is more prominent than on the previous examination measuring up to 10 mm. While there is no significant surrounding inflammatory change, early or mild tip  appendicitis is not   excluded. Clinical and laboratory correlation are recommended.  2. Cholecystectomy.  3. Hysterectomy.  4. Bilateral low-density adrenal nodules are stable.         Electronically Signed: Sandy Fraire MD    3/29/2025 5:16 PM EDT    Workstation ID: [id number]            Result Review:  I have personally reviewed the following checkmarked results from the time of this admission to 3/29/2025 17:43 EDT:  x laboratory  Microbiology  Radiology  EKG/Telemetry   Cardiology/Vascular   Pathology  Old records  Other:        Assessment & Plan   Assessment / Plan     Assessment:  Active Hospital Problems:  Active Hospital Problems    Diagnosis     **Acute appendicitis        Plan:   Acute appendicitis  -Afebrile, vitals stable, leukocytosis 12,000  -CT abdomen pelvis reviewed  -History/exam/imaging all consistent with acute appendicitis  -Recommend operative intervention with laparoscopic possible open appendectomy and any other indicated procedure  -Risks/benefits/alternatives of the procedure were explained to the patient and she was agreeable to proceed    Electronically signed by Reid Barragan MD, 03/29/25, 5:43 PM EDT.

## 2025-03-29 NOTE — OP NOTE
APPENDECTOMY LAPAROSCOPIC POSSIBLE OPEN, OOPHORECTOMY LAPAROSCOPIC  Procedure Report    Patient Name:  Jesi Reyes  YOB: 1980    Date of Surgery:  3/29/2025     Indications: Acute appendicitis    Pre-op Diagnosis:   Acute appendicitis, unspecified acute appendicitis type [K35.80]       Post-Op Diagnosis Codes:     * Acute appendicitis, unspecified acute appendicitis type [K35.80]  Hemorrhagic cyst of the right ovary    Procedure/CPT® Codes:  MI LAPAROSCOPY W/RMVL ADNEXAL STRUCTURES [65149]    Procedure(s):  Laparoscopic appendectomy  Laparoscopic right sided oophorectomy        Staff:  Surgeon(s):  Reid Barragan MD    Assistant: Jennifer Nixon RN CSA    Anesthesia: General    Estimated Blood Loss: 20 mL    Implants:    Implant Name Type Inv. Item Serial No.  Lot No. LRB No. Used Action   RELOAD STPLR ECHELON FLEX GST STND 60 WHT - [id number] Implant RELOAD STPLR ECHELON FLEX GST STND 60 WHT  [employer]  DIV OF J AND J [id number] N/A 1 Implanted       Specimen:          Specimens       ID Source Type Tests Collected By Collected At Frozen?    A Large Intestine, Appendix Tissue TISSUE PATHOLOGY EXAM   Reid Barragan MD 3/29/25 1856     B Ovary, Right Tissue TISSUE PATHOLOGY EXAM   Reid Barragan MD 3/29/25 1908     Description: right ovary with cystic mass                Findings: Nonperforated appendicitis.  Hemorrhagic appearing cystic lesion in the remnant right ovary after supposed total hysterectomy.    Complications: None apparent at the time of surgery    Description of Procedure: Informed consent was obtained before the patient was brought to the operating suite and placed in the supine position.  General endotracheal anesthesia was induced and administered throughout the procedure.  The patient's abdomen was prepped and draped in standard sterile fashion before a timeout procedure was performed, verifying the correct patient, procedure, and other pertinent  information.    Using a #15 blade scalpel, an incision was made at the umbilicus and the abdomen was entered using the Sarina technique.  12 mm balloon trocar was inserted in the abdomen and the gas applied to achieve adequate pneumoperitoneum of 15 mmHg.  Laparoscope was inserted and confirmed safe entrance into the abdomen.  Under direct visualization 2 additional 5 mm trocars were placed: one in the suprapubic region and another in the left lower quadrant.  Attention was turned to the right lower quadrant where the visibly inflamed and nonperforated appendix was easily identified.  Inflammatory attachments to the lateral abdominal wall were serially taken down using the Harmonic scalpel to help expose the base.  Mesoappendix around the base of the appendix was taken down in a similar fashion.  Cecum appeared healthy and noninflamed, so decision was made for simple appendectomy.  60 mm Endo NELL laparoscopic stapler was used to transect the appendix at its base before the specimen was collected in an Endo Catch bag.  Staple line was inspected and appeared healthy.  Hemostasis was verified.  Specimen was removed from the abdomen.    Attention was then turned to the pelvis where the remnant right ovary appeared to be grossly enlarged with a multilobulated hemorrhagic cyst.  I discussed the intraoperative findings with the on-call OB/GYN who recommended to proceed with right sided oophorectomy.  Remnant ovary was sharply dissected off the peritoneum using the harmonic scalpel.  Small vessel was identified and ligated again using the harmonic scalpel.  A separate Endo Catch bag was inserted in the abdomen and the specimen placed inside and removed at the end of the procedure.  Hemostasis was again verified.  Pneumoperitoneum was released and the trocars removed under direct visualization.  Fascia at the umbilicus was closed with a 0 PDS in figure-of-eight fashion.  Skin at all sites was closed with staples before  application of a sterile dressing over top each site to conclude the procedure.    Patient tolerated the procedure well and there were no immediate complications.  All counts were correct x2 at the end of the procedure.    Disposition: Stable in PACU - plan for discharge home          The surgical first assist listed above assisted with all needed aspects of the procedure.    Electronically signed by Reid Barragan MD, 03/29/25, 7:28 PM EDT.

## 2025-03-29 NOTE — ANESTHESIA PREPROCEDURE EVALUATION
Anesthesia Evaluation     Patient summary reviewed and Nursing notes reviewed   no history of anesthetic complications:   NPO Solid Status: > 8 hours  NPO Liquid Status: > 2 hours           Airway   Mallampati: II  TM distance: >3 FB  Neck ROM: full  No difficulty expected  Dental      Pulmonary - normal exam    breath sounds clear to auscultation  (+) COPD mild, asthma,  Cardiovascular - normal exam  Exercise tolerance: good (4-7 METS)    Rhythm: regular  Rate: normal    (+) hypertension, hyperlipidemia      Neuro/Psych  (+) CVA, psychiatric history Anxiety and Depression  GI/Hepatic/Renal/Endo    (+) GERD well controlled, diabetes mellitus    Musculoskeletal (-) negative ROS    Abdominal    Substance History - negative use     OB/GYN negative ob/gyn ROS         Other - negative ROS       ROS/Med Hx Other: PAT Nursing Notes unavailable.                     Anesthesia Plan    ASA 3 - emergent     general   Rapid sequence  (Patient understands anesthesia not responsible for dental damage.)  intravenous induction     Anesthetic plan, risks, benefits, and alternatives have been provided, discussed and informed consent has been obtained with: patient.  Pre-procedure education provided      CODE STATUS:

## 2025-03-29 NOTE — ANESTHESIA POSTPROCEDURE EVALUATION
Patient: Jesi Reyes    Procedure Summary       Date: 03/29/25 Room / Location: [facility] / [facility] MAIN OR; [facility]    Anesthesia Start: 1836 Anesthesia Stop: 1935    Procedures:       CT ABDOMEN PELVIS W CONTRAST      APPENDECTOMY LAPAROSCOPIC POSSIBLE OPEN; plain text: removal of appendix (Bilateral: Abdomen)      OOPHORECTOMY LAPAROSCOPIC (Right: Abdomen) Diagnosis:       Acute appendicitis, unspecified acute appendicitis type      (R flank pain)      (Acute appendicitis, unspecified acute appendicitis type [K35.80])    Scheduled Providers: Reid Barragan MD Provider: Rickie Knox MD    Anesthesia Type: general ASA Status: 3 - Emergent            Anesthesia Type: general    Vitals  Vitals Value Taken Time   BP     Temp     Pulse 109 03/29/25 19:36   Resp     SpO2 92 % 03/29/25 19:36   Vitals shown include unfiled device data.        Post Anesthesia Care and Evaluation    Patient location during evaluation: bedside  Patient participation: complete - patient participated  Level of consciousness: awake and alert  Pain management: adequate    Airway patency: patent  Anesthetic complications: No anesthetic complications  PONV Status: none  Cardiovascular status: acceptable  Respiratory status: acceptable  Hydration status: acceptable    Comments: An Anesthesiologist personally participated in the most demanding procedures (including induction and emergence if applicable) in the anesthesia plan, monitored the course of anesthesia administration at frequent intervals and remained physically present and available for immediate diagnosis and treatment of emergencies.

## 2025-03-29 NOTE — ED PROVIDER NOTES
Time: 4:26 PM EDT  Date of encounter:  3/29/2025  Independent Historian/Clinical History and Information was obtained by:   Patient    History is limited by: N/A    Chief Complaint   Patient presents with    Flank Pain         History of Present Illness:  Patient is a 44 y.o. year old female who presents to the emergency department for evaluation of right flank pain that started yesterday morning around 5 AM.  Patient states it is not radiating to her RLQ. She states she had some leftover Bactrim and took that with no relief.  She also states she was having dysuria so she took some Pyridium.  She is not sure if she was having hematuria.  Patient has a history of kidney stones.  Admits to nausea and chills.  Denies vomiting and fevers.  Patient states she takes hydrocodone 4 times a day with no relief.     Patient Care Team  Primary Care Provider: Alyssia Cowan APRN    Past Medical History:     Allergies   Allergen Reactions    Egg-Derived Products Nausea And Vomiting    Emetrol Unknown - High Severity    Ketorolac Tromethamine Hives    Latex Hives    Lecithin Hives    Mometasone Furoate Other (See Comments)     Lactose intolerance    Penicillins Swelling    Sulfa Antibiotics Hives    Tramadol Hives     Past Medical History:   Diagnosis Date    Asthma     Chest pain     COPD (chronic obstructive pulmonary disease)     Depression     Diabetes mellitus     GERD (gastroesophageal reflux disease)     Hyperlipidemia     Hypertension     Kidney stone     Palpitation     Stroke      Past Surgical History:   Procedure Laterality Date    HERNIA REPAIR      HYSTERECTOMY      TUBAL ABDOMINAL LIGATION       Family History   Problem Relation Age of Onset    Hypertension Mother     Hypertension Father     Stroke Paternal Aunt     Heart attack Paternal Uncle     Hypertension Maternal Grandmother     Hypertension Paternal Grandmother     Heart disease Paternal Grandmother     Hypertension Paternal Grandfather     Heart  disease Paternal Grandfather     Heart attack Paternal Grandfather        Home Medications:  Prior to Admission medications    Medication Sig Start Date End Date Taking? Authorizing Provider   albuterol (ACCUNEB) 1.25 MG/3ML nebulizer solution  5/13/21   Margarita Brown MD   Atorvastatin Calcium (LIPITOR PO) Take  by mouth.    Margarita Brown MD   bisoprolol-hydrochlorothiazide (ZIAC) 10-6.25 MG per tablet TAKE ONE TABLET BY MOUTH DAILY 4/17/23   Cecelia Harrison MD   cephalexin (KEFLEX) 500 MG capsule Take 1 capsule by mouth 3 (Three) Times a Day. 1/15/25   Feliberto Lorenzo MD   cetirizine (zyrTEC) 10 MG tablet  6/10/21   Margarita Brown MD   cyclobenzaprine (FLEXERIL) 10 MG tablet Take 1 tablet by mouth.    Margarita Brown MD   doxepin (SINEquan) 25 MG capsule  5/29/21   Margarita Brown MD   HYDROcodone-acetaminophen (NORCO) 7.5-325 MG per tablet  2/19/21   Margarita Brown MD   HYDROcodone-acetaminophen (NORCO) 7.5-325 MG per tablet Take 1 tablet by mouth Every 6 (Six) Hours As Needed for Severe Pain. 1/15/25   Feliberto Lorenzo MD   mupirocin (BACTROBAN) 2 % ointment  6/19/21   Margarita Brown MD   pramipexole (MIRAPEX) 0.5 MG tablet  7/6/21   Margarita Brown MD   pregabalin (LYRICA) 150 MG capsule  6/15/21   Margarita Brown MD   SUMAtriptan (IMITREX) 50 MG tablet  5/14/21   Margarita Brown MD   venlafaxine XR (EFFEXOR-XR) 150 MG 24 hr capsule  3/3/21   Margarita Brown MD   vitamin D (ERGOCALCIFEROL) 1.25 MG (49004 UT) capsule capsule  7/4/21   Margarita Brown MD        Social History:   Social History     Tobacco Use    Smoking status: Every Day     Types: Cigarettes    Smokeless tobacco: Never   Vaping Use    Vaping status: Never Used   Substance Use Topics    Alcohol use: Never    Drug use: Never         Review of Systems:  Review of Systems   Constitutional:  Positive for chills. Negative for fever.   HENT: Negative.     Eyes:  "Negative.    Respiratory: Negative.     Cardiovascular: Negative.    Gastrointestinal:  Positive for abdominal pain and nausea. Negative for vomiting.   Endocrine: Negative.    Genitourinary:  Positive for dysuria and flank pain. Negative for hematuria.   Skin: Negative.    Allergic/Immunologic: Negative.    Neurological: Negative.    Hematological: Negative.    Psychiatric/Behavioral: Negative.          Physical Exam:  /67 (BP Location: Left arm, Patient Position: Sitting)   Pulse 106   Temp 98.5 °F (36.9 °C) (Oral)   Resp 20   Ht 162.6 cm (64\")   Wt 75.6 kg (166 lb 10.7 oz)   SpO2 96%   BMI 28.61 kg/m²         Physical Exam  Vitals and nursing note reviewed.   Constitutional:       General: She is not in acute distress.     Appearance: Normal appearance. She is not ill-appearing, toxic-appearing or diaphoretic.   HENT:      Head: Normocephalic and atraumatic.      Nose: Nose normal.      Mouth/Throat:      Mouth: Mucous membranes are moist.   Eyes:      Extraocular Movements: Extraocular movements intact.      Conjunctiva/sclera: Conjunctivae normal.      Pupils: Pupils are equal, round, and reactive to light.   Cardiovascular:      Rate and Rhythm: Normal rate and regular rhythm.      Heart sounds: Normal heart sounds.   Pulmonary:      Effort: Pulmonary effort is normal.      Breath sounds: Normal breath sounds.   Abdominal:      General: Bowel sounds are normal. There is distension.      Palpations: Abdomen is soft.      Tenderness: There is abdominal tenderness in the right upper quadrant and right lower quadrant. There is right CVA tenderness. There is no left CVA tenderness or guarding.   Musculoskeletal:         General: Normal range of motion.      Cervical back: Normal range of motion and neck supple.   Skin:     General: Skin is warm and dry.   Neurological:      General: No focal deficit present.      Mental Status: She is alert and oriented to person, place, and time.   Psychiatric:        "  Mood and Affect: Mood normal.         Behavior: Behavior normal.                         Medical Decision Making:    Comorbidities that affect care:    Kidney stones, Asthma, COPD, Diabetes    External Notes reviewed:    Previous ED Note: [facility] ED visit 1/13/2025 for a fall      The following orders were placed and all results were independently analyzed by me:  Orders Placed This Encounter   Procedures    CT Abdomen Pelvis With Contrast    [place] Draw    Comprehensive Metabolic Panel    Lipase    Urinalysis With Microscopic If Indicated (No Culture) - Urine, Clean Catch    CBC Auto Differential    Scan Slide    Urinalysis, Microscopic Only - Urine, Clean Catch    NPO Diet NPO Type: Strict NPO    Undress & Gown    IP Consult to General Surgery    Insert Peripheral IV    CBC & Differential    Green Top (Gel)    Lavender Top    Gold Top - SST    Light Blue Top    Extra Tubes    Gray Top    Send To OR       Medications Given in the Emergency Department:  Medications   sodium chloride 0.9 % flush 10 mL (has no administration in time range)   cefTRIAXone (ROCEPHIN) in NS 1 gram/10ml IV PUSH syringe (has no administration in time range)   metroNIDAZOLE (FLAGYL) IVPB 500 mg (has no administration in time range)   ondansetron (ZOFRAN) injection 4 mg (4 mg Intravenous Given 3/29/25 1626)   HYDROmorphone (DILAUDID) injection 1 mg (1 mg Intravenous Given 3/29/25 1631)   iopamidol (ISOVUE-370) 76 % injection 100 mL (100 mL Intravenous Given 3/29/25 1649)        ED Course:    The patient was initially evaluated in the triage area where orders were placed. The patient was later dispositioned by Ramsey Gauthier PA-C.      The patient was advised to stay for completion of workup which includes but is not limited to communication of labs and radiological results, reassessment and plan. The patient was advised that leaving prior to disposition by a provider could result in critical findings that are not communicated to the  "patient.     ED Course as of 03/29/25 1733   Sat Mar 29, 2025   1718 Message from radiologist Natasha Fraire MD;    hello, the tip of the appendix is prominent/thickened though the base and rest of it appears normal.  could be \"tip appendicitis\".   [AJ]   1724  early or mild tip appendicitis [AJ]      ED Course User Index  [AJ] Ramsey Gauthier, JOHNNY       Labs:    Lab Results (last 24 hours)       Procedure Component Value Units Date/Time    CBC & Differential [[id number]]  (Abnormal) Collected: 03/29/25 1620    Specimen: Blood Updated: 03/29/25 1648    Narrative:      The following orders were created for panel order CBC & Differential.  Procedure                               Abnormality         Status                     ---------                               -----------         ------                     CBC Auto Differential[[id number]]        Abnormal            Final result               Scan Slide[[id number]]                                       Final result                 Please view results for these tests on the individual orders.    Comprehensive Metabolic Panel [[id number]]  (Abnormal) Collected: 03/29/25 1620    Specimen: Blood Updated: 03/29/25 1652     Glucose 101 mg/dL      BUN 9 mg/dL      Creatinine 1.00 mg/dL      Sodium 136 mmol/L      Potassium 3.5 mmol/L      Chloride 102 mmol/L      CO2 22.9 mmol/L      Calcium 8.8 mg/dL      Total Protein 6.9 g/dL      Albumin 4.1 g/dL      ALT (SGPT) 13 U/L      AST (SGOT) 14 U/L      Alkaline Phosphatase 44 U/L      Total Bilirubin 0.2 mg/dL      Globulin 2.8 gm/dL      A/G Ratio 1.5 g/dL      BUN/Creatinine Ratio 9.0     Anion Gap 11.1 mmol/L      eGFR 71.8 mL/min/1.73     Narrative:      GFR Categories in Chronic Kidney Disease (CKD)      GFR Category          GFR (mL/min/1.73)    Interpretation  G1                     90 or greater         Normal or high (1)  G2                      60-89                Mild decrease (1)  G3a                   45-59   "              Mild to moderate decrease  G3b                   30-44                Moderate to severe decrease  G4                    15-29                Severe decrease  G5                    14 or less           Kidney failure          (1)In the absence of evidence of kidney disease, neither GFR category G1 or G2 fulfill the criteria for CKD.    eGFR calculation 2021 CKD-EPI creatinine equation, which does not include race as a factor    Lipase [[id number]]  (Normal) Collected: 03/29/25 1620    Specimen: Blood Updated: 03/29/25 1652     Lipase 20 U/L     Urinalysis With Microscopic If Indicated (No Culture) - Urine, Clean Catch [[id number]]  (Abnormal) Collected: 03/29/25 1620    Specimen: Urine, Clean Catch Updated: 03/29/25 1658     Color, UA [place]     Appearance, UA Clear     pH, UA --     Comment: Result not available due to interfering substances.        Specific Gravity, UA <=1.005     Glucose, UA --     Comment: Result not available due to interfering substances.        Ketones, UA --     Comment: Result not available due to interfering substances.        Bilirubin, UA --     Comment: Result not available due to interfering substances.        Blood, UA --     Comment: Result not available due to interfering substances.        Protein, UA --     Comment: Result not available due to interfering substances.        Leuk Esterase, UA --     Comment: Result not available due to interfering substances.        Nitrite, UA --     Comment: Result not available due to interfering substances.        Urobilinogen, UA --     Comment: Result not available due to interfering substances.       CBC Auto Differential [[id number]]  (Abnormal) Collected: 03/29/25 1620    Specimen: Blood Updated: 03/29/25 1648     WBC 12.33 10*3/mm3      RBC 4.48 10*6/mm3      Hemoglobin 14.3 g/dL      Hematocrit 41.7 %      MCV 93.1 fL      MCH 31.9 pg      MCHC 34.3 g/dL      RDW 13.1 %      RDW-SD 44.6 fl      MPV 11.7 fL      Platelets 148  10*3/mm3      Neutrophil % 72.5 %      Lymphocyte % 20.8 %      Monocyte % 4.8 %      Eosinophil % 1.4 %      Basophil % 0.3 %      Immature Grans % 0.2 %      Neutrophils, Absolute 8.93 10*3/mm3      Lymphocytes, Absolute 2.57 10*3/mm3      Monocytes, Absolute 0.59 10*3/mm3      Eosinophils, Absolute 0.17 10*3/mm3      Basophils, Absolute 0.04 10*3/mm3      Immature Grans, Absolute 0.03 10*3/mm3      nRBC 0.0 /100 WBC     Scan Slide [[id number]] Collected: 03/29/25 1620    Specimen: Blood Updated: 03/29/25 1648     RBC Morphology Normal     WBC Morphology Normal     Platelet Estimate Adequate    Urinalysis, Microscopic Only - Urine, Clean Catch [[id number]]  (Abnormal) Collected: 03/29/25 1620    Specimen: Urine, Clean Catch Updated: 03/29/25 1638     RBC, UA 0-2 /HPF      WBC, UA 3-5 /HPF      Bacteria, UA Trace /HPF      Squamous Epithelial Cells, UA 0-2 /HPF      Transitional Epithelial Cells, UA 3-6 /HPF      Renal Epithelial Cells, UA 0-2 /HPF      Hyaline Casts, UA None Seen /LPF      Methodology Automated Microscopy             Imaging:    CT Abdomen Pelvis With Contrast  Result Date: 3/29/2025  CT ABDOMEN PELVIS W CONTRAST Date of Exam: 3/29/2025 4:43 PM EDT Indication: R flank pain. Comparison: 1/15/2025 Technique: Axial CT images were obtained of the abdomen and pelvis after the uneventful intravenous administration of iodinated contrast. Reconstructed coronal and sagittal images were also obtained. Automated exposure control and iterative construction methods were used. Findings: LUNG BASES:  Unremarkable without mass or infiltrate. LIVER: There is focal fatty infiltration adjacent to the falciform ligament. BILIARY/GALLBLADDER: The gallbladder is surgically absent. SPLEEN:  Unremarkable PANCREAS:  Unremarkable ADRENAL: There are bilateral low-density adrenal lesions which are similar to the previous study. KIDNEYS:  Unremarkable parenchyma with no solid mass identified. No obstruction.  No calculus  identified. GASTROINTESTINAL/MESENTERY:  Small bowel loops are normal in caliber without obstruction or inflammation.  There is a redundant sigmoid colon. The majority of the appendix contains air and is normal in caliber; however, the tip of the appendix appears more prominent than on the prior study and measures up to 10 mm in diameter with suggestion of mucosal thickening (series 2 image 152). MESENTERIC VESSELS:  Patent. AORTA/IVC:  Normal caliber. RETROPERITONEUM/LYMPH NODES:  Unremarkable REPRODUCTIVE: The patient is status post hysterectomy. There is no adnexal mass. Tiny bit of fluid in the pelvis. BLADDER:  Unremarkable OSSEUS STRUCTURES:  Typical for age with no acute process identified.     Impression: 1. The base and majority of the appendix are normal in caliber but the tip is more prominent than on the previous examination measuring up to 10 mm. While there is no significant surrounding inflammatory change, early or mild tip appendicitis is not excluded. Clinical and laboratory correlation are recommended. 2. Cholecystectomy. 3. Hysterectomy. 4. Bilateral low-density adrenal nodules are stable. Electronically Signed: Sandy Fraire MD  3/29/2025 5:16 PM EDT  Workstation ID: [id number]        Differential Diagnosis and Discussion:      Abdominal Pain: Based on the patient's signs and symptoms, I considered abdominal aortic aneurysm, small bowel obstruction, pancreatitis, acute cholecystitis, acute appendecitis, peptic ulcer disease, gastritis, colitis, endocrine disorders, irritable bowel syndrome and other differential diagnosis an etiology of the patient's abdominal pain.  Dysuria: Differential diagnosis includes but is not limited to urethritis, cystitis, pyelonephritis, ureteral calculi, neoplasm, chemical irritant, urethral stricture, and trauma  Flank Pain: Differential diagnosis includes but is not limited to kidney stones, pyelonephritis, musculoskeletal disorders, renal infarction, urinary tract  infection, hydronephrosis, radiculopathy, aortic aneurysm, renal cell carcinoma.    PROCEDURES:    Labs were collected in the emergency department and all labs were reviewed and interpreted by me.  CT scan was performed in the emergency department and the CT scan radiology impression was interpreted by me.    No orders to display        Procedures    MDM     Amount and/or Complexity of Data Reviewed  Clinical lab tests: reviewed  Tests in the radiology section of CPT®: reviewed                     Patient Care Considerations:    SEPSIS was considered but is NOT present in the emergency department as SIRS criteria is not present.      Consultants/Shared Management Plan:    Consultant: I have discussed the case with Dr. Barragan, general surgeon who states he will take the patient to the OR today in the [place] after    Social Determinants of Health:    Patient is independent, reliable, and has access to care.       Disposition and Care Coordination:    Send to OR/Endoscopy        Final diagnoses:   Acute appendicitis, unspecified acute appendicitis type        ED Disposition       ED Disposition   Send to OR    Condition   --    Comment   --               This medical record created using voice recognition software.             Ramsey Gauthier PA-C  03/29/25 [id number]

## 2025-03-31 ENCOUNTER — TELEPHONE (OUTPATIENT)
Dept: SURGERY | Facility: CLINIC | Age: 45
End: 2025-03-31
Payer: COMMERCIAL

## 2025-03-31 NOTE — TELEPHONE ENCOUNTER
PATIENT HAD APPENDECTOMY LATE SATURDAY/EARLY SUNDAY. SHE CALLED TO REPORT THAT SHE HAS A KNOT BIGGER THAN A GOLF BALL NEXT TO HER UMBILICUS THAT IS BURNING. SHE IS GOING TO TRY TO SEND A PIC THROUGH MY CHART. SHE DOES NOT HAVE A F/U APPT YET AND WOULD LIKE IT LOOKED AT.

## 2025-04-02 ENCOUNTER — OFFICE VISIT (OUTPATIENT)
Dept: SURGERY | Facility: CLINIC | Age: 45
End: 2025-04-02
Payer: COMMERCIAL

## 2025-04-02 VITALS — WEIGHT: 171.4 LBS | HEIGHT: 64 IN | BODY MASS INDEX: 29.26 KG/M2

## 2025-04-02 DIAGNOSIS — K35.80 ACUTE APPENDICITIS, UNSPECIFIED ACUTE APPENDICITIS TYPE: ICD-10-CM

## 2025-04-02 DIAGNOSIS — Z72.0 TOBACCO USE: ICD-10-CM

## 2025-04-02 DIAGNOSIS — G89.18 POST-OPERATIVE PAIN: Primary | ICD-10-CM

## 2025-04-02 RX ORDER — TIZANIDINE 2 MG/1
2 TABLET ORAL EVERY 8 HOURS PRN
COMMUNITY
Start: 2025-03-21

## 2025-04-02 RX ORDER — CYCLOBENZAPRINE HCL 10 MG
10 TABLET ORAL 3 TIMES DAILY PRN
COMMUNITY
Start: 2025-04-02

## 2025-04-02 RX ORDER — DIVALPROEX SODIUM 500 MG/1
1 TABLET, FILM COATED, EXTENDED RELEASE ORAL DAILY
COMMUNITY
Start: 2024-12-31

## 2025-04-02 RX ORDER — LIDOCAINE 50 MG/G
1 PATCH TOPICAL EVERY 24 HOURS
COMMUNITY
Start: 2025-03-21

## 2025-04-02 RX ORDER — DIVALPROEX SODIUM 250 MG/1
250 TABLET, DELAYED RELEASE ORAL ONCE
COMMUNITY
Start: 2025-01-14

## 2025-04-02 RX ORDER — OXYCODONE AND ACETAMINOPHEN 10; 325 MG/1; MG/1
1 TABLET ORAL EVERY 6 HOURS PRN
Qty: 15 TABLET | Refills: 0 | Status: SHIPPED | OUTPATIENT
Start: 2025-04-02

## 2025-04-02 RX ORDER — PRAZOSIN HYDROCHLORIDE 5 MG/1
1 CAPSULE ORAL DAILY
COMMUNITY
Start: 2025-03-09

## 2025-04-02 RX ORDER — PRAMIPEXOLE DIHYDROCHLORIDE 0.12 MG/1
0.12 TABLET ORAL 3 TIMES DAILY
COMMUNITY

## 2025-04-02 RX ORDER — MONTELUKAST SODIUM 10 MG/1
10 TABLET ORAL NIGHTLY
COMMUNITY
Start: 2025-03-31

## 2025-04-02 RX ORDER — DAPAGLIFLOZIN 10 MG/1
TABLET, FILM COATED ORAL
COMMUNITY
Start: 2025-03-31

## 2025-04-02 RX ORDER — SEMAGLUTIDE 0.68 MG/ML
1 INJECTION, SOLUTION SUBCUTANEOUS WEEKLY
COMMUNITY

## 2025-04-02 NOTE — PROGRESS NOTES
"Chief Complaint  Post-op (Knot near umbilicus )    Subjective    Subjective     Jesi Reyes is a 44 y.o. female who presents to [facility] GENERAL SURGERY    History of Present Illness  44-year-old female who presents today for evaluation of a periumbilical pain she developed after surgery.  She underwent laparoscopic appendectomy and right sided oophorectomy over the weekend.  She denies any nausea, vomiting, fevers, chills.  Post-op      Personal History     Social History     Tobacco Use    Smoking status: Every Day     Types: Cigarettes    Smokeless tobacco: Never   Vaping Use    Vaping status: Never Used   Substance Use Topics    Alcohol use: Never    Drug use: Never     Allergies   Allergen Reactions    Egg-Derived Products Nausea And Vomiting    Emetrol Unknown - High Severity    Ketorolac Tromethamine Hives    Latex Hives    Lecithin Hives    Mometasone Furoate Other (See Comments)     Lactose intolerance    Penicillins Swelling    Sulfa Antibiotics Hives    Tramadol Hives       Objective    Objective   Vital Signs:   Ht 162.6 cm (64\")   Wt 77.7 kg (171 lb 6.4 oz)   BMI 29.42 kg/m²       Physical Exam  Constitutional:       Appearance: Normal appearance.   HENT:      Head: Normocephalic and atraumatic.      Mouth/Throat:      Mouth: Mucous membranes are moist.      Pharynx: Oropharynx is clear.   Cardiovascular:      Rate and Rhythm: Normal rate and regular rhythm.   Pulmonary:      Effort: Pulmonary effort is normal. No respiratory distress.   Abdominal:      General: There is no distension.      Palpations: Abdomen is soft.      Tenderness: There is no abdominal tenderness.      Comments: Laparoscopic incisions x 3 all healing well; slight bulge to the right of her periumbilical incision   Musculoskeletal:         General: No swelling. Normal range of motion.      Cervical back: Normal range of motion and neck supple.   Skin:     General: Skin is warm and dry.   Neurological:      " General: No focal deficit present.      Mental Status: She is alert and oriented to person, place, and time.   Psychiatric:         Mood and Affect: Mood normal.         Behavior: Behavior normal.                  Assessment / Plan      Diagnoses and all orders for this visit:    1. Post-operative pain (Primary)  -     oxyCODONE-acetaminophen (Percocet)  MG per tablet; Take 1 tablet by mouth Every 6 (Six) Hours As Needed for Moderate Pain or Severe Pain (Pain).  Dispense: 15 tablet; Refill: 0  -     CT Abdomen Pelvis Without Contrast; Future    2. Acute appendicitis, unspecified acute appendicitis type    3. Tobacco use    44-year-old female status post laparoscopic appendectomy and removal of remnant right ovary.  Pathology pending.  Unclear if swelling near her umbilicus represents a postoperative hernia, so we will evaluate outpatient with CT abdomen pelvis.  She may otherwise follow-up with me as previously scheduled within the next week.    Follow Up   Return in about 2 weeks (around 4/16/2025).      Patient was given instructions and counseling regarding her condition or for health maintenance advice. Please see specific information pulled into the AVS if appropriate.     Electronically signed by Reid Barragan MD, 04/02/25, 1:47 PM EDT.

## 2025-04-03 LAB
BACTERIA SPEC AEROBE CULT: NORMAL
BACTERIA SPEC AEROBE CULT: NORMAL
CYTO UR: NORMAL
LAB AP CASE REPORT: NORMAL
LAB AP CLINICAL INFORMATION: NORMAL
LAB AP SPECIAL STAINS: NORMAL
PATH REPORT.FINAL DX SPEC: NORMAL
PATH REPORT.GROSS SPEC: NORMAL

## 2025-04-16 ENCOUNTER — OFFICE VISIT (OUTPATIENT)
Dept: SURGERY | Facility: CLINIC | Age: 45
End: 2025-04-16
Payer: COMMERCIAL

## 2025-04-16 VITALS — WEIGHT: 173.8 LBS | HEIGHT: 64 IN | BODY MASS INDEX: 29.67 KG/M2

## 2025-04-16 DIAGNOSIS — Z98.890 POST-OPERATIVE STATE: Primary | ICD-10-CM

## 2025-04-16 NOTE — PROGRESS NOTES
"Chief Complaint  Follow-up (2 WEEK. PATIENT STATES SHE TOOK HER STAPLES OUT YESTERDAY 4/15/.25.)    Subjective    Subjective     Jesi Reyes is a 44 y.o. female who presents to [facility] GENERAL SURGERY    History of Present Illness  44-year-old female who presents for routine scheduled follow-up after undergoing laparoscopic appendectomy and right oophorectomy.  Patient removed her staples at home yesterday without issue as it was causing her to itch.  She denies any current nausea, vomiting, fevers, chills.  She is tolerating a regular diet and having normal bowel movements.      Personal History     Social History     Tobacco Use    Smoking status: Every Day     Types: Cigarettes    Smokeless tobacco: Never   Vaping Use    Vaping status: Never Used   Substance Use Topics    Alcohol use: Never    Drug use: Never     Allergies   Allergen Reactions    Egg-Derived Products Nausea And Vomiting    Emetrol Unknown - High Severity    Ketorolac Tromethamine Hives    Latex Hives    Lecithin Hives    Mometasone Furoate Other (See Comments)     Lactose intolerance    Penicillins Swelling    Sulfa Antibiotics Hives    Tramadol Hives       Objective    Objective   Vital Signs:   Ht 162.6 cm (64\")   Wt 78.8 kg (173 lb 12.8 oz)   BMI 29.83 kg/m²       Physical Exam  Constitutional:       Appearance: Normal appearance.   HENT:      Head: Normocephalic and atraumatic.      Mouth/Throat:      Mouth: Mucous membranes are moist.      Pharynx: Oropharynx is clear.   Cardiovascular:      Rate and Rhythm: Normal rate and regular rhythm.   Pulmonary:      Effort: Pulmonary effort is normal. No respiratory distress.   Abdominal:      General: There is no distension.      Palpations: Abdomen is soft.      Tenderness: There is no abdominal tenderness.      Comments: Laparoscopic incisions x 3 all healing well   Musculoskeletal:         General: No swelling. Normal range of motion.      Cervical back: Normal range of " motion and neck supple.   Skin:     General: Skin is warm and dry.   Neurological:      General: No focal deficit present.      Mental Status: She is alert and oriented to person, place, and time.   Psychiatric:         Mood and Affect: Mood normal.         Behavior: Behavior normal.                  Assessment / Plan      Diagnoses and all orders for this visit:    1. Post-operative state (Primary)    44-year-old female status post appendectomy and right sided oophorectomy.  Benign pathology reviewed.  Doing well postoperatively.  Educated on lifting restrictions moving forward.  Work note provided.  She may otherwise follow-up with me again in clinic as needed.    Follow Up   Return if symptoms worsen or fail to improve.      Patient was given instructions and counseling regarding her condition or for health maintenance advice. Please see specific information pulled into the AVS if appropriate.     Electronically signed by Reid Barragan MD, 04/16/25, 1:39 PM EDT.

## 2025-04-22 ENCOUNTER — PATIENT MESSAGE (OUTPATIENT)
Dept: SURGERY | Facility: CLINIC | Age: 45
End: 2025-04-22
Payer: COMMERCIAL

## 2025-04-28 ENCOUNTER — TELEPHONE (OUTPATIENT)
Dept: SURGERY | Facility: CLINIC | Age: 45
End: 2025-04-28
Payer: COMMERCIAL

## 2025-04-28 NOTE — TELEPHONE ENCOUNTER
Called to collect [place]25.00 [facility] [place] paperwork. No answer, left VM.

## 2025-05-09 ENCOUNTER — HOSPITAL ENCOUNTER (OUTPATIENT)
Dept: CT IMAGING | Facility: HOSPITAL | Age: 45
Discharge: HOME | End: 2025-05-09
Admitting: STUDENT IN AN ORGANIZED HEALTH CARE EDUCATION/TRAINING PROGRAM
Payer: COMMERCIAL

## 2025-05-09 DIAGNOSIS — G89.18 POST-OPERATIVE PAIN: ICD-10-CM

## 2025-05-09 PROCEDURE — 74176 CT ABD & PELVIS W/O CONTRAST: CPT

## 2025-08-02 ENCOUNTER — APPOINTMENT (OUTPATIENT)
Dept: CT IMAGING | Facility: HOSPITAL | Age: 45
End: 2025-08-02
Payer: COMMERCIAL

## 2025-08-02 ENCOUNTER — HOSPITAL ENCOUNTER (EMERGENCY)
Facility: HOSPITAL | Age: 45
Discharge: HOME | End: 2025-08-02
Attending: EMERGENCY MEDICINE
Payer: COMMERCIAL

## 2025-08-02 VITALS
SYSTOLIC BLOOD PRESSURE: 125 MMHG | HEIGHT: 65 IN | BODY MASS INDEX: 30.05 KG/M2 | TEMPERATURE: 98.3 F | DIASTOLIC BLOOD PRESSURE: 72 MMHG | RESPIRATION RATE: 18 BRPM | WEIGHT: 180.34 LBS | HEART RATE: 86 BPM | OXYGEN SATURATION: 100 %

## 2025-08-02 DIAGNOSIS — K59.00 CONSTIPATION, UNSPECIFIED CONSTIPATION TYPE: Primary | ICD-10-CM

## 2025-08-02 DIAGNOSIS — N39.0 ACUTE URINARY TRACT INFECTION: ICD-10-CM

## 2025-08-02 LAB
ALBUMIN SERPL-MCNC: 4.6 G/DL (ref 3.5–5.2)
ALBUMIN/GLOB SERPL: 1.7 G/DL
ALP SERPL-CCNC: 48 U/L (ref 39–117)
ALT SERPL W P-5'-P-CCNC: 22 U/L (ref 1–33)
ANION GAP SERPL CALCULATED.3IONS-SCNC: 9.3 MMOL/L (ref 5–15)
AST SERPL-CCNC: 17 U/L (ref 1–32)
BACTERIA UR QL AUTO: ABNORMAL /HPF
BASOPHILS # BLD AUTO: 0.03 10*3/MM3 (ref 0–0.2)
BASOPHILS NFR BLD AUTO: 0.3 % (ref 0–1.5)
BILIRUB SERPL-MCNC: <0.2 MG/DL (ref 0–1.2)
BILIRUB UR QL STRIP: NEGATIVE
BUN SERPL-MCNC: 13.1 MG/DL (ref 6–20)
BUN/CREAT SERPL: 18.5 (ref 7–25)
CALCIUM SPEC-SCNC: 9.6 MG/DL (ref 8.6–10.5)
CHLORIDE SERPL-SCNC: 105 MMOL/L (ref 98–107)
CLARITY UR: CLEAR
CO2 SERPL-SCNC: 27.7 MMOL/L (ref 22–29)
COLOR UR: YELLOW
CREAT SERPL-MCNC: 0.71 MG/DL (ref 0.57–1)
DEPRECATED RDW RBC AUTO: 46.8 FL (ref 37–54)
EGFRCR SERPLBLD CKD-EPI 2021: 107.7 ML/MIN/1.73
EOSINOPHIL # BLD AUTO: 0.28 10*3/MM3 (ref 0–0.4)
EOSINOPHIL NFR BLD AUTO: 2.8 % (ref 0.3–6.2)
ERYTHROCYTE [DISTWIDTH] IN BLOOD BY AUTOMATED COUNT: 13.2 % (ref 12.3–15.4)
GLOBULIN UR ELPH-MCNC: 2.7 GM/DL
GLUCOSE BLDC GLUCOMTR-MCNC: 91 MG/DL (ref 70–99)
GLUCOSE SERPL-MCNC: 54 MG/DL (ref 65–99)
GLUCOSE UR STRIP-MCNC: NEGATIVE MG/DL
HCT VFR BLD AUTO: 46.1 % (ref 34–46.6)
HGB BLD-MCNC: 14.8 G/DL (ref 12–15.9)
HGB UR QL STRIP.AUTO: NEGATIVE
HOLD SPECIMEN: NORMAL
HOLD SPECIMEN: NORMAL
HYALINE CASTS UR QL AUTO: ABNORMAL /LPF
IMM GRANULOCYTES # BLD AUTO: 0.03 10*3/MM3 (ref 0–0.05)
IMM GRANULOCYTES NFR BLD AUTO: 0.3 % (ref 0–0.5)
KETONES UR QL STRIP: NEGATIVE
LEUKOCYTE ESTERASE UR QL STRIP.AUTO: ABNORMAL
LYMPHOCYTES # BLD AUTO: 3.85 10*3/MM3 (ref 0.7–3.1)
LYMPHOCYTES NFR BLD AUTO: 38.5 % (ref 19.6–45.3)
MCH RBC QN AUTO: 31 PG (ref 26.6–33)
MCHC RBC AUTO-ENTMCNC: 32.1 G/DL (ref 31.5–35.7)
MCV RBC AUTO: 96.4 FL (ref 79–97)
MONOCYTES # BLD AUTO: 0.48 10*3/MM3 (ref 0.1–0.9)
MONOCYTES NFR BLD AUTO: 4.8 % (ref 5–12)
NEUTROPHILS NFR BLD AUTO: 5.32 10*3/MM3 (ref 1.7–7)
NEUTROPHILS NFR BLD AUTO: 53.3 % (ref 42.7–76)
NITRITE UR QL STRIP: NEGATIVE
NRBC BLD AUTO-RTO: 0 /100 WBC (ref 0–0.2)
PH UR STRIP.AUTO: 8 [PH] (ref 5–8)
PLATELET # BLD AUTO: 197 10*3/MM3 (ref 140–450)
PMV BLD AUTO: 11.1 FL (ref 6–12)
POTASSIUM SERPL-SCNC: 3.7 MMOL/L (ref 3.5–5.2)
PROT SERPL-MCNC: 7.3 G/DL (ref 6–8.5)
PROT UR QL STRIP: NEGATIVE
RBC # BLD AUTO: 4.78 10*6/MM3 (ref 3.77–5.28)
RBC # UR STRIP: ABNORMAL /HPF
REF LAB TEST METHOD: ABNORMAL
SODIUM SERPL-SCNC: 142 MMOL/L (ref 136–145)
SP GR UR STRIP: 1.02 (ref 1–1.03)
SQUAMOUS #/AREA URNS HPF: ABNORMAL /HPF
UROBILINOGEN UR QL STRIP: ABNORMAL
WBC # UR STRIP: ABNORMAL /HPF
WBC NRBC COR # BLD AUTO: 9.99 10*3/MM3 (ref 3.4–10.8)
WHOLE BLOOD HOLD COAG: NORMAL
WHOLE BLOOD HOLD SPECIMEN: NORMAL

## 2025-08-02 PROCEDURE — 74177 CT ABD & PELVIS W/CONTRAST: CPT

## 2025-08-02 PROCEDURE — 25510000001 IOPAMIDOL PER 1 ML: Performed by: EMERGENCY MEDICINE

## 2025-08-02 PROCEDURE — 99285 EMERGENCY DEPT VISIT HI MDM: CPT

## 2025-08-02 PROCEDURE — 82948 REAGENT STRIP/BLOOD GLUCOSE: CPT

## 2025-08-02 PROCEDURE — 81001 URINALYSIS AUTO W/SCOPE: CPT | Performed by: EMERGENCY MEDICINE

## 2025-08-02 PROCEDURE — 85025 COMPLETE CBC W/AUTO DIFF WBC: CPT

## 2025-08-02 PROCEDURE — 80053 COMPREHEN METABOLIC PANEL: CPT | Performed by: NURSE PRACTITIONER

## 2025-08-02 RX ORDER — SODIUM CHLORIDE 0.9 % (FLUSH) 0.9 %
10 SYRINGE (ML) INJECTION AS NEEDED
Status: DISCONTINUED | OUTPATIENT
Start: 2025-08-02 | End: 2025-08-03 | Stop reason: HOSPADM

## 2025-08-02 RX ORDER — HYDROCODONE BITARTRATE AND ACETAMINOPHEN 5; 325 MG/1; MG/1
1 TABLET ORAL ONCE AS NEEDED
Refills: 0 | Status: COMPLETED | OUTPATIENT
Start: 2025-08-02 | End: 2025-08-02

## 2025-08-02 RX ORDER — NITROFURANTOIN 25; 75 MG/1; MG/1
100 CAPSULE ORAL 2 TIMES DAILY
Qty: 10 CAPSULE | Refills: 0 | Status: SHIPPED | OUTPATIENT
Start: 2025-08-02 | End: 2025-08-07

## 2025-08-02 RX ORDER — IOPAMIDOL 755 MG/ML
100 INJECTION, SOLUTION INTRAVASCULAR
Status: COMPLETED | OUTPATIENT
Start: 2025-08-02 | End: 2025-08-02

## 2025-08-02 RX ADMIN — HYDROCODONE BITARTRATE AND ACETAMINOPHEN 1 TABLET: 5; 325 TABLET ORAL at 22:22

## 2025-08-02 RX ADMIN — IOPAMIDOL 100 ML: 755 INJECTION, SOLUTION INTRAVENOUS at 21:59

## 2025-08-02 NOTE — ED PROVIDER NOTES
Time: 7:55 PM EDT  Date of encounter:  8/2/2025  Independent Historian/Clinical History and Information was obtained by:   Patient    History is limited by: N/A    Chief Complaint: Generalized abdominal pain, vaginal spotting      History of Present Illness:  Patient is a 45 y.o. year old female who presents to the emergency department for evaluation of generalized abdominal pain and vaginal bleeding.  Patient states that she thinks that she has some hernias above her umbilicus and in her groin, also reports that she has had some scant vaginal bleeding/spotting that started this morning.  She had a partial hysterectomy 15 years ago, had her last ovary removed in April.  Complains of nausea secondary to pain.  Denies any vaginal pain.      Patient Care Team  Primary Care Provider: Alyssia Cowan APRN    Past Medical History:     Allergies   Allergen Reactions    Doxycycline Nausea And Vomiting    Egg-Derived Products Nausea And Vomiting    Emetrol Unknown - High Severity    Ketorolac Tromethamine Hives    Latex Hives    Lecithin Hives    Mometasone Furoate Other (See Comments)     Lactose intolerance    Penicillins Swelling    Sulfa Antibiotics Hives    Tramadol Hives     Past Medical History:   Diagnosis Date    Asthma     Chest pain     COPD (chronic obstructive pulmonary disease)     DDD (degenerative disc disease), lumbar     Depression     Diabetes mellitus     Felicity-Danlos disease     Gastroparesis     GERD (gastroesophageal reflux disease)     Hyperlipidemia     Hypertension     Kidney stone     Osteoarthritis     HIPS AND PELVIS    Palpitation     Stroke      Past Surgical History:   Procedure Laterality Date    APPENDECTOMY Bilateral 03/29/2025    Procedure: APPENDECTOMY LAPAROSCOPIC ; plain text: removal of appendix;  Surgeon: Reid Barragan MD;  Location: [facility];  Service: General;  Laterality: Bilateral;    CHOLECYSTECTOMY      HERNIA REPAIR      UMBILICAL    HYSTERECTOMY       OOPHORECTOMY Right 03/29/2025    Procedure: OOPHORECTOMY LAPAROSCOPIC;  Surgeon: Reid Barragan MD;  Location: [facility] MAIN OR;  Service: General;  Laterality: Right;    SINUS SURGERY      TUBAL ABDOMINAL LIGATION       Family History   Problem Relation Age of Onset    Hypertension Mother     Hypertension Father     Stroke Paternal Aunt     Heart attack Paternal Uncle     Hypertension Maternal Grandmother     Hypertension Paternal Grandmother     Heart disease Paternal Grandmother     Hypertension Paternal Grandfather     Heart disease Paternal Grandfather     Heart attack Paternal Grandfather        Home Medications:  Prior to Admission medications    Medication Sig Start Date End Date Taking? Authorizing Provider   albuterol sulfate  (90 Base) MCG/ACT inhaler INHALE 2 PUFFS BY MOUTH EVERY 4 TO 6 HOURS AS NEEDED FOR WHEEZING AND FOR SHORTNESS OF BREATH 6/12/25   Margarita Brown MD   atomoxetine (STRATTERA) 18 MG capsule  6/25/25   Margarita Brown MD   Atorvastatin Calcium (LIPITOR PO) Take  by mouth.    Margarita Brown MD   bisoprolol (ZEBeta) 10 MG tablet Take 1 tablet by mouth Daily.    Margarita Brown MD   bisoprolol-hydrochlorothiazide (ZIAC) 10-6.25 MG per tablet TAKE ONE TABLET BY MOUTH DAILY 4/17/23   Cecelia Harrison MD   cetirizine (zyrTEC) 10 MG tablet  6/10/21   Margarita Brown MD   divalproex (DEPAKOTE ER) 500 MG 24 hr tablet Take 1 tablet by mouth Daily. 12/31/24   Margarita Brown MD   divalproex (DEPAKOTE) 250 MG DR tablet Take 1 tablet by mouth 1 (One) Time. 1/14/25   Margarita Brown MD   Farxiga 10 MG tablet  3/31/25   Margarita Brown MD   HYDROcodone-acetaminophen (NORCO) 7.5-325 MG per tablet Take 1 tablet by mouth Every 6 (Six) Hours As Needed for Severe Pain. 1/15/25   Feliberto Lorenzo MD   lidocaine (LIDODERM) 5 % Place 1 patch on the skin as directed by provider Daily. 3/21/25   Margarita Brown MD   montelukast (SINGULAIR) 10  MG tablet Take 1 tablet by mouth Every Night. 3/31/25   Margarita Brown MD   ondansetron ODT (ZOFRAN-ODT) 8 MG disintegrating tablet DISSOLVE 1 TABLET IN MOUTH EVERY 8 HOURS BY TRANSLINGUAL ROUTE FOR 5 DAYS 6/11/25   Margarita Brown MD   oxybutynin (DITROPAN) 5 MG tablet  6/25/25   Margarita Brown MD   pramipexole (Mirapex) 0.125 MG tablet Take 1 tablet by mouth 3 (Three) Times a Day.    Margarita Brown MD   pramipexole (MIRAPEX) 0.5 MG tablet  7/6/21   Margarita Brown MD   prazosin (MINIPRESS) 5 MG capsule Take 1 capsule by mouth Daily. 3/9/25   Margarita Brown MD   pregabalin (LYRICA) 150 MG capsule  6/15/21   Margarita Brown MD   promethazine (PHENERGAN) 25 MG tablet Take 1 tablet by mouth Every 8 (Eight) Hours As Needed. for nausea 6/11/25   Margarita Brown MD   risperiDONE (risperDAL) 0.5 MG tablet  6/23/25   Margarita Brown MD   Semaglutide,0.25 or 0.5MG/DOS, (Ozempic, 0.25 or 0.5 MG/DOSE,) 2 MG/3ML solution pen-injector Inject 1 mg under the skin into the appropriate area as directed 1 (One) Time Per Week.    Margarita Brown MD   SUMAtriptan (IMITREX) 50 MG tablet  5/14/21   Margarita Brown MD   tiZANidine (ZANAFLEX) 2 MG tablet Take 1 tablet by mouth Every 8 (Eight) Hours As Needed. 3/21/25   Margarita Brown MD   venlafaxine XR (EFFEXOR-XR) 150 MG 24 hr capsule  3/3/21   Margarita Brown MD   cephalexin (KEFLEX) 500 MG capsule Take 1 capsule by mouth 3 (Three) Times a Day.  Patient not taking: Reported on 4/16/2025 1/15/25 8/2/25  Feliberto Lorenzo MD   cyclobenzaprine (FLEXERIL) 10 MG tablet Take 1 tablet by mouth 3 (Three) Times a Day As Needed.  Patient not taking: Reported on 4/16/2025 4/2/25 8/2/25  Margarita Brown MD   doxepin (SINEquan) 25 MG capsule  5/29/21 8/2/25  Provider, Historical, MD   mupirocin (BACTROBAN) 2 % ointment  6/19/21 8/2/25  Provider, Historical, MD   oxyCODONE-acetaminophen (Percocet)  MG per  "tablet Take 1 tablet by mouth Every 6 (Six) Hours As Needed for Moderate Pain or Severe Pain (Pain).  Patient not taking: Reported on 4/16/2025 4/2/25 8/2/25  Reid Barragan MD   vitamin D (ERGOCALCIFEROL) 1.25 MG (27068 UT) capsule capsule  7/4/21 8/2/25  ProviderMargarita MD        Social History:   Social History     Tobacco Use    Smoking status: Every Day     Types: Cigarettes    Smokeless tobacco: Never   Vaping Use    Vaping status: Never Used   Substance Use Topics    Alcohol use: Never    Drug use: Never         Review of Systems:  Review of Systems   Constitutional:  Negative for chills and fever.   HENT:  Negative for congestion, rhinorrhea and sore throat.    Eyes:  Negative for pain and visual disturbance.   Respiratory:  Negative for apnea, cough, chest tightness and shortness of breath.    Cardiovascular:  Negative for chest pain and palpitations.   Gastrointestinal:  Positive for abdominal pain (Generalized abdominal pain) and nausea. Negative for diarrhea and vomiting.   Genitourinary:  Positive for vaginal bleeding (Spotting since this morning). Negative for difficulty urinating and dysuria.   Musculoskeletal:  Negative for joint swelling and myalgias.   Skin:  Negative for color change.   Neurological:  Negative for seizures and headaches.   Psychiatric/Behavioral: Negative.     All other systems reviewed and are negative.       Physical Exam:  /81   Pulse 86   Temp 97.8 °F (36.6 °C) (Oral)   Resp 18   Ht 165.1 cm (65\")   Wt 81.8 kg (180 lb 5.4 oz)   SpO2 100%   BMI 30.01 kg/m²     Physical Exam  Vitals and nursing note reviewed.   Constitutional:       General: She is not in acute distress.     Appearance: Normal appearance. She is not toxic-appearing.   HENT:      Head: Normocephalic and atraumatic.      Jaw: There is normal jaw occlusion.   Eyes:      General: Lids are normal.      Extraocular Movements: Extraocular movements intact.      Conjunctiva/sclera: Conjunctivae " normal.      Pupils: Pupils are equal, round, and reactive to light.   Cardiovascular:      Rate and Rhythm: Normal rate and regular rhythm.      Pulses: Normal pulses.      Heart sounds: Normal heart sounds.   Pulmonary:      Effort: Pulmonary effort is normal. No respiratory distress.      Breath sounds: Normal breath sounds. No wheezing or rhonchi.   Abdominal:      General: Abdomen is flat.      Palpations: Abdomen is soft.      Tenderness: There is abdominal tenderness (Umbilical tenderness). There is guarding. There is no right CVA tenderness, left CVA tenderness or rebound.      Hernia: A hernia (Umbilical hernia noted) is present.   Musculoskeletal:         General: Normal range of motion.      Cervical back: Normal range of motion and neck supple.      Right lower leg: No edema.      Left lower leg: No edema.   Skin:     General: Skin is warm and dry.   Neurological:      Mental Status: She is alert and oriented to person, place, and time. Mental status is at baseline.   Psychiatric:         Mood and Affect: Mood normal.                    Medical Decision Making:      Comorbidities that affect care:    Asthma, COPD, Diabetes, Hypertension, Obesity    External Notes reviewed:    None      The following orders were placed and all results were independently analyzed by me:  Orders Placed This Encounter   Procedures    CT Abdomen Pelvis With Contrast    [place] Draw    CBC Auto Differential    Urinalysis With Microscopic If Indicated (No Culture) - Urine, Clean Catch    Urinalysis, Microscopic Only - Urine, Clean Catch    Comprehensive Metabolic Panel    NPO Diet NPO Type: Strict NPO    Undress & Gown    Vital Signs    Orthostatic Blood Pressure    Supplies To Bedside - Notify MD When Ready- Pelvic cart / set up    Oxygen Therapy- Nasal Cannula; Titrate 1-6 LPM Per SpO2; 90 - 95%    Insert Peripheral IV    Insert Peripheral IV    CBC & Differential    Green Top (Gel)    Lavender Top    Gold Top - SST    Light  Blue Top       Medications Given in the Emergency Department:  Medications   sodium chloride 0.9 % flush 10 mL (has no administration in time range)   sodium chloride 0.9 % flush 10 mL (has no administration in time range)   iopamidol (ISOVUE-370) 76 % injection 100 mL (100 mL Intravenous Given 8/2/25 2159)   HYDROcodone-acetaminophen (NORCO) 5-325 MG per tablet 1 tablet (1 tablet Oral Given 8/2/25 2222)        ED Course:         Labs:    Lab Results (last 24 hours)       Procedure Component Value Units Date/Time    Urinalysis With Microscopic If Indicated (No Culture) - Urine, Clean Catch [[id number]]  (Abnormal) Collected: 08/02/25 1932    Specimen: Urine, Clean Catch Updated: 08/02/25 1954     Color, UA Yellow     Appearance, UA Clear     pH, UA 8.0     Specific Gravity, UA 1.019     Glucose, UA Negative     Ketones, UA Negative     Bilirubin, UA Negative     Blood, UA Negative     Protein, UA Negative     Leuk Esterase, UA Moderate (2+)     Nitrite, UA Negative     Urobilinogen, UA 0.2 E.U./dL    Urinalysis, Microscopic Only - Urine, Clean Catch [[id number]]  (Abnormal) Collected: 08/02/25 1932    Specimen: Urine, Clean Catch Updated: 08/02/25 2037     RBC, UA None Seen /HPF      WBC, UA 21-50 /HPF      Bacteria, UA None Seen /HPF      Squamous Epithelial Cells, UA 3-6 /HPF      Hyaline Casts, UA None Seen /LPF      Methodology Automated Microscopy    CBC & Differential [[id number]]  (Abnormal) Collected: 08/02/25 1940    Specimen: Blood Updated: 08/02/25 1947    Narrative:      The following orders were created for panel order CBC & Differential.  Procedure                               Abnormality         Status                     ---------                               -----------         ------                     CBC Auto Differential[[id number]]        Abnormal            Final result                 Please view results for these tests on the individual orders.    CBC Auto Differential [[id number]]  (Abnormal)  Collected: 08/02/25 1940    Specimen: Blood Updated: 08/02/25 1947     WBC 9.99 10*3/mm3      RBC 4.78 10*6/mm3      Hemoglobin 14.8 g/dL      Hematocrit 46.1 %      MCV 96.4 fL      MCH 31.0 pg      MCHC 32.1 g/dL      RDW 13.2 %      RDW-SD 46.8 fl      MPV 11.1 fL      Platelets 197 10*3/mm3      Neutrophil % 53.3 %      Lymphocyte % 38.5 %      Monocyte % 4.8 %      Eosinophil % 2.8 %      Basophil % 0.3 %      Immature Grans % 0.3 %      Neutrophils, Absolute 5.32 10*3/mm3      Lymphocytes, Absolute 3.85 10*3/mm3      Monocytes, Absolute 0.48 10*3/mm3      Eosinophils, Absolute 0.28 10*3/mm3      Basophils, Absolute 0.03 10*3/mm3      Immature Grans, Absolute 0.03 10*3/mm3      nRBC 0.0 /100 WBC     Comprehensive Metabolic Panel [[id number]]  (Abnormal) Collected: 08/02/25 1940    Specimen: Blood Updated: 08/02/25 2140     Glucose 54 mg/dL      BUN 13.1 mg/dL      Creatinine 0.71 mg/dL      Sodium 142 mmol/L      Potassium 3.7 mmol/L      Comment: Slight hemolysis detected by analyzer. Result may be falsely elevated.        Chloride 105 mmol/L      CO2 27.7 mmol/L      Calcium 9.6 mg/dL      Total Protein 7.3 g/dL      Albumin 4.6 g/dL      ALT (SGPT) 22 U/L      AST (SGOT) 17 U/L      Alkaline Phosphatase 48 U/L      Total Bilirubin <0.2 mg/dL      Globulin 2.7 gm/dL      A/G Ratio 1.7 g/dL      BUN/Creatinine Ratio 18.5     Anion Gap 9.3 mmol/L      eGFR 107.7 mL/min/1.73     Narrative:      GFR Categories in Chronic Kidney Disease (CKD)              GFR Category          GFR (mL/min/1.73)    Interpretation  G1                    90 or greater        Normal or high (1)  G2                    60-89                Mild decrease (1)  G3a                   45-59                Mild to moderate decrease  G3b                   30-44                Moderate to severe decrease  G4                    15-29                Severe decrease  G5                    14 or less           Kidney failure    (1)In the absence of  evidence of kidney disease, neither GFR category G1 or G2 fulfill the criteria for CKD.    eGFR calculation 2021 CKD-EPI creatinine equation, which does not include race as a factor             Imaging:    CT Abdomen Pelvis With Contrast  Result Date: 8/2/2025  CT ABDOMEN PELVIS W CONTRAST Date of Exam: 8/2/2025 9:51 PM EDT Indication: Abdominal pain and possible hernia, spotting vaginal bleeding. Comparison: 5/9/2025 Technique: Axial CT images were obtained of the abdomen and pelvis after the uneventful intravenous administration of iodinated contrast. Reconstructed coronal and sagittal images were also obtained. Automated exposure control and iterative construction methods were used. Findings: There is minimal scarring/atelectasis in the lung bases. Abdominal aorta is nonaneurysmal. Atherosclerotic calcification is present. Gallbladder is surgically absent. No biliary obstruction. Bilateral hypodense adrenal adenomas are unchanged. Solid abdominal organs are otherwise normal. The kidneys are nonobstructed. Urinary bladder is normal. Uterus is surgically absent. The vagina and vaginal cuff are unremarkable. There is a moderate to large colonic stool burden compatible with constipation. No evidence of acute colitis. The appendix is surgically absent. Gas-filled mildly dilated small bowel loops suggesting ileus. No definite small bowel obstruction is seen. The stomach appears normal. No adenopathy is identified. No free fluid.     1.Moderate to large colonic stool burden compatible with constipation. 2.Gas-filled mildly dilated small bowel loops suggesting ileus. No definite small bowel obstruction is seen. 3.Cholecystectomy, appendectomy, hysterectomy. 4.Stable bilateral adrenal adenomas. Electronically Signed: Yony Anderson MD  8/2/2025 10:10 PM EDT  Workstation ID: [id number]        Differential Diagnosis and Discussion:    Abdominal Pain: Based on the patient's signs and symptoms, I considered abdominal aortic  aneurysm, small bowel obstruction, pancreatitis, acute cholecystitis, acute appendecitis, peptic ulcer disease, gastritis, colitis, endocrine disorders, irritable bowel syndrome and other differential diagnosis an etiology of the patient's abdominal pain.    PROCEDURES:    Labs were collected in the emergency department and all labs were reviewed and interpreted by me.  CT scan was performed in the emergency department and the CT scan radiology impression was interpreted by me.    No orders to display       Procedures    MDM     Amount and/or Complexity of Data Reviewed  Clinical lab tests: reviewed       The patient is resting comfortably and feels better, is alert and in no distress. Repeat examination is unremarkable and benign; in particular, there's no discomfort at McBurney's point and there is no pulsatile mass. The history, exam, diagnostic testing, and current condition does not suggest acute appendicitis, bowel obstruction, acute cholecystitis, bowel perforation, major gastrointestinal bleeding, severe diverticulitis, abdominal aortic aneurysm, mesenteric ischemia, volvulus, sepsis, or other significant pathology that warrants further testing, continued ED treatment, admission, for surgical evaluation at this point. The vital signs have been stable. The patient does not have uncontrollable pain, intractable vomiting, or other significant symptoms. The patient's condition is stable and appropriate for discharge from the emergency department.                Patient Care Considerations:    SEPSIS was considered but is NOT present in the emergency department as SIRS criteria is not present.      Consultants/Shared Management Plan:    SHARED VISIT: I have discussed the case with my supervising physician, Dr. Dowell who states agreement with plan. The substantive portion of the medical decision was made by the attesting physician who made or approve the management plan and will take responsibility for the  patient.  Clinical findings were discussed and ultimate disposition was made in consult with supervising physician.    Social Determinants of Health:    Patient is independent, reliable, and has access to care.       Disposition and Care Coordination:    Discharged: The patient is suitable and stable for discharge with no need for consideration of admission.    I have explained discharge medications and the need for follow up with the patient/caretakers. This was also printed in the discharge instructions. Patient was discharged with the following medications and follow up:      Medication List        New Prescriptions      magnesium citrate solution  Take 296 mL by mouth 1 (One) Time for 1 dose.     nitrofurantoin (macrocrystal-monohydrate) 100 MG capsule  Commonly known as: Macrobid  Take 1 capsule by mouth 2 (Two) Times a Day for 5 days.               Where to Get Your Medications        These medications were sent to [facility] [address] [place] [address] [address] [facility] [phone] [facility] [phone]   [address] [place] [address]      Phone: [phone]   magnesium citrate solution  nitrofurantoin (macrocrystal-monohydrate) 100 MG capsule      Alyssia Cowan APRN  [address] [facility] [address] [phone]    In 1 week         Final diagnoses:   Constipation, unspecified constipation type   Acute urinary tract infection        ED Disposition       ED Disposition   Discharge    Condition   Stable    Comment   --               This medical record created using voice recognition software.             Kimberley Anne APRN  08/02/25 2550

## 2025-08-03 NOTE — DISCHARGE INSTRUCTIONS
You presented to the emergency department today with complaint of abdominal pain.  You also have had some mild spotty vaginal bleeding.  CT of your abdomen shows that you have significant constipation.  The urinalysis was positive for a urinary tract infection.  The vagina and vaginal cuff are unremarkable.  If you continue to have spotting, you need to follow-up with your GYN.  Make an appointment to follow-up with your PCP within the next week.  I sent an antibiotic in for the UTI its twice a day for 5 days, and also magnesium citrate, that is for the constipation.

## 2025-08-03 NOTE — ED PROVIDER NOTES
"SHARED VISIT ATTESTATION:    This visit was performed by myself and an APC.  I personally approved the management plan/medical decision making and take responsibility for the patient management.      SHARED VISIT NOTE:    Patient is 45 y.o. year old female that presents to the ED for evaluation of abdominal pain.     Physical Exam    ED Course:    /72 (BP Location: Left arm, Patient Position: Lying)   Pulse 86   Temp 98.3 °F (36.8 °C) (Oral)   Resp 18   Ht 165.1 cm (65\")   Wt 81.8 kg (180 lb 5.4 oz)   SpO2 100%   BMI 30.01 kg/m²       The following orders were placed and all results were independently analyzed by me:  Orders Placed This Encounter   Procedures    CT Abdomen Pelvis With Contrast    [place] Draw    CBC Auto Differential    Urinalysis With Microscopic If Indicated (No Culture) - Urine, Clean Catch    Urinalysis, Microscopic Only - Urine, Clean Catch    Comprehensive Metabolic Panel    Undress & Gown    Vital Signs    Supplies To Bedside - Notify MD When Ready- Pelvic cart / set up    POC Glucose Once    CBC & Differential    Green Top (Gel)    Lavender Top    Gold Top - SST    Light Blue Top       Medications Given in the Emergency Department:  Medications   iopamidol (ISOVUE-370) 76 % injection 100 mL (100 mL Intravenous Given 8/2/25 2159)   HYDROcodone-acetaminophen (NORCO) 5-325 MG per tablet 1 tablet (1 tablet Oral Given 8/2/25 2222)        ED Course:         Labs:    Lab Results (last 24 hours)       Procedure Component Value Units Date/Time    Urinalysis With Microscopic If Indicated (No Culture) - Urine, Clean Catch [[id number]]  (Abnormal) Collected: 08/02/25 1932    Specimen: Urine, Clean Catch Updated: 08/02/25 1954     Color, UA Yellow     Appearance, UA Clear     pH, UA 8.0     Specific Gravity, UA 1.019     Glucose, UA Negative     Ketones, UA Negative     Bilirubin, UA Negative     Blood, UA Negative     Protein, UA Negative     Leuk Esterase, UA Moderate (2+)     Nitrite, UA " Negative     Urobilinogen, UA 0.2 E.U./dL    Urinalysis, Microscopic Only - Urine, Clean Catch [[id number]]  (Abnormal) Collected: 08/02/25 1932    Specimen: Urine, Clean Catch Updated: 08/02/25 2037     RBC, UA None Seen /HPF      WBC, UA 21-50 /HPF      Bacteria, UA None Seen /HPF      Squamous Epithelial Cells, UA 3-6 /HPF      Hyaline Casts, UA None Seen /LPF      Methodology Automated Microscopy    CBC & Differential [[id number]]  (Abnormal) Collected: 08/02/25 1940    Specimen: Blood Updated: 08/02/25 1947    Narrative:      The following orders were created for panel order CBC & Differential.  Procedure                               Abnormality         Status                     ---------                               -----------         ------                     CBC Auto Differential[[id number]]        Abnormal            Final result                 Please view results for these tests on the individual orders.    CBC Auto Differential [[id number]]  (Abnormal) Collected: 08/02/25 1940    Specimen: Blood Updated: 08/02/25 1947     WBC 9.99 10*3/mm3      RBC 4.78 10*6/mm3      Hemoglobin 14.8 g/dL      Hematocrit 46.1 %      MCV 96.4 fL      MCH 31.0 pg      MCHC 32.1 g/dL      RDW 13.2 %      RDW-SD 46.8 fl      MPV 11.1 fL      Platelets 197 10*3/mm3      Neutrophil % 53.3 %      Lymphocyte % 38.5 %      Monocyte % 4.8 %      Eosinophil % 2.8 %      Basophil % 0.3 %      Immature Grans % 0.3 %      Neutrophils, Absolute 5.32 10*3/mm3      Lymphocytes, Absolute 3.85 10*3/mm3      Monocytes, Absolute 0.48 10*3/mm3      Eosinophils, Absolute 0.28 10*3/mm3      Basophils, Absolute 0.03 10*3/mm3      Immature Grans, Absolute 0.03 10*3/mm3      nRBC 0.0 /100 WBC     Comprehensive Metabolic Panel [[id number]]  (Abnormal) Collected: 08/02/25 1940    Specimen: Blood Updated: 08/02/25 2140     Glucose 54 mg/dL      BUN 13.1 mg/dL      Creatinine 0.71 mg/dL      Sodium 142 mmol/L      Potassium 3.7 mmol/L      Comment:  Slight hemolysis detected by analyzer. Result may be falsely elevated.        Chloride 105 mmol/L      CO2 27.7 mmol/L      Calcium 9.6 mg/dL      Total Protein 7.3 g/dL      Albumin 4.6 g/dL      ALT (SGPT) 22 U/L      AST (SGOT) 17 U/L      Alkaline Phosphatase 48 U/L      Total Bilirubin <0.2 mg/dL      Globulin 2.7 gm/dL      A/G Ratio 1.7 g/dL      BUN/Creatinine Ratio 18.5     Anion Gap 9.3 mmol/L      eGFR 107.7 mL/min/1.73     Narrative:      GFR Categories in Chronic Kidney Disease (CKD)              GFR Category          GFR (mL/min/1.73)    Interpretation  G1                    90 or greater        Normal or high (1)  G2                    60-89                Mild decrease (1)  G3a                   45-59                Mild to moderate decrease  G3b                   30-44                Moderate to severe decrease  G4                    15-29                Severe decrease  G5                    14 or less           Kidney failure    (1)In the absence of evidence of kidney disease, neither GFR category G1 or G2 fulfill the criteria for CKD.    eGFR calculation 2021 CKD-EPI creatinine equation, which does not include race as a factor    POC Glucose Once [[id number]]  (Normal) Collected: 08/02/25 2237    Specimen: Blood Updated: 08/02/25 2239     Glucose 91 mg/dL      Comment: Serial Number: [id number]:  [id number]                Imaging:    CT Abdomen Pelvis With Contrast  Result Date: 8/2/2025  CT ABDOMEN PELVIS W CONTRAST Date of Exam: 8/2/2025 9:51 PM EDT Indication: Abdominal pain and possible hernia, spotting vaginal bleeding. Comparison: 5/9/2025 Technique: Axial CT images were obtained of the abdomen and pelvis after the uneventful intravenous administration of iodinated contrast. Reconstructed coronal and sagittal images were also obtained. Automated exposure control and iterative construction methods were used. Findings: There is minimal scarring/atelectasis in the lung bases. Abdominal  aorta is nonaneurysmal. Atherosclerotic calcification is present. Gallbladder is surgically absent. No biliary obstruction. Bilateral hypodense adrenal adenomas are unchanged. Solid abdominal organs are otherwise normal. The kidneys are nonobstructed. Urinary bladder is normal. Uterus is surgically absent. The vagina and vaginal cuff are unremarkable. There is a moderate to large colonic stool burden compatible with constipation. No evidence of acute colitis. The appendix is surgically absent. Gas-filled mildly dilated small bowel loops suggesting ileus. No definite small bowel obstruction is seen. The stomach appears normal. No adenopathy is identified. No free fluid.     1.Moderate to large colonic stool burden compatible with constipation. 2.Gas-filled mildly dilated small bowel loops suggesting ileus. No definite small bowel obstruction is seen. 3.Cholecystectomy, appendectomy, hysterectomy. 4.Stable bilateral adrenal adenomas. Electronically Signed: Yony Anderson MD  8/2/2025 10:10 PM EDT  Workstation ID: [id number]      MDM:    Procedures    Labs were collected in the emergency department and all labs were reviewed and interpreted by me.  CT scan was performed in the emergency department and the CT scan radiology impression was interpreted by me.                     Brenden Dowell MD  02:21 EDT  08/03/25         Brenden Dowell MD  08/03/25 0221

## (undated) DEVICE — MICRO HVTSA, 0.5G AND HVTSA SOURCEMARK PRODUCT CODE M1206 AND M1206-01: Brand: EXOFIN MICRO HVTSA, 0.5G

## (undated) DEVICE — SUT MNCRYL PLS ANTIB UD 4/0 PS2 18IN

## (undated) DEVICE — SUT PDS2 0 CT1 27IN Z340H MF VIL

## (undated) DEVICE — SYR LL TP 10ML STRL

## (undated) DEVICE — GENERAL LAPAROSCOPY-LF: Brand: MEDLINE INDUSTRIES, INC.

## (undated) DEVICE — GOWN,SIRUS,POLYRNF,BRTHSLV,2XL,18/CS: Brand: MEDLINE

## (undated) DEVICE — TISSUE RETRIEVAL SYSTEM: Brand: INZII RETRIEVAL SYSTEM

## (undated) DEVICE — LAPAROSCOPIC TROCAR SLEEVE/SINGLE USE: Brand: KII® OPTICAL ACCESS SYSTEM

## (undated) DEVICE — THE STERILE LIGHT HANDLE COVER IS USED WITH STERIS SURGICAL LIGHTING AND VISUALIZATION SYSTEMS.

## (undated) DEVICE — 2, DISPOSABLE SUCTION/IRRIGATOR WITHOUT DISPOSABLE TIP: Brand: STRYKEFLOW

## (undated) DEVICE — INTENDED FOR TISSUE SEPARATION, AND OTHER PROCEDURES THAT REQUIRE A SHARP SURGICAL BLADE TO PUNCTURE OR CUT.: Brand: BARD-PARKER ® CARBON RIB-BACK BLADES

## (undated) DEVICE — STERILE POLYISOPRENE POWDER-FREE SURGICAL GLOVES WITH EMOLLIENT COATING: Brand: PROTEXIS

## (undated) DEVICE — SOL IRR NACL 0.9PCT BO 1000ML

## (undated) DEVICE — TROCARS: Brand: KII® BALLOON BLUNT TIP SYSTEM

## (undated) DEVICE — SLV SCD KN/LEN ADJ EXPRSS BLENDED MD 1P/U

## (undated) DEVICE — LAPAROVUE VISIBILITY SYSTEM LAPAROSCOPIC SOLUTIONS: Brand: LAPAROVUE

## (undated) DEVICE — STERILE POLYISOPRENE POWDER-FREE SURGICAL GLOVES: Brand: PROTEXIS

## (undated) DEVICE — TROCAR: Brand: KII® SLEEVE

## (undated) DEVICE — LIGACLIP 10-M/L, 10MM ENDOSCOPIC ROTATING MULTIPLE CLIP APPLIERS
Type: IMPLANTABLE DEVICE | Status: NON-FUNCTIONAL
Brand: LIGACLIP

## (undated) DEVICE — SOL IRR NACL 0.9PCT 1000ML

## (undated) DEVICE — HARMONIC 700 SHEARS, ADVANCED HEMOSTASIS: Brand: HARMONIC

## (undated) DEVICE — LAPAROSCOPIC SCISSORS: Brand: EPIX LAPAROSCOPIC SCISSORS

## (undated) DEVICE — PROXIMATE RH ROTATING HEAD SKIN STAPLERS (35 WIDE) CONTAINS 35 STAINLESS STEEL STAPLES: Brand: PROXIMATE

## (undated) DEVICE — GLV SURG SENSICARE PI ORTHO SZ6.5 LF STRL

## (undated) DEVICE — ECHELON FLEX POWERED PLUS ARTICULATING ENDOSCOPIC LINEAR CUTTER , 60MM: Brand: ECHELON FLEX